# Patient Record
Sex: FEMALE | Race: BLACK OR AFRICAN AMERICAN | Employment: UNEMPLOYED | URBAN - METROPOLITAN AREA
[De-identification: names, ages, dates, MRNs, and addresses within clinical notes are randomized per-mention and may not be internally consistent; named-entity substitution may affect disease eponyms.]

---

## 2024-05-13 ENCOUNTER — OFFICE VISIT (OUTPATIENT)
Age: 34
End: 2024-05-13
Payer: MEDICAID

## 2024-05-13 VITALS
HEIGHT: 66 IN | BODY MASS INDEX: 47.09 KG/M2 | WEIGHT: 293 LBS | DIASTOLIC BLOOD PRESSURE: 85 MMHG | RESPIRATION RATE: 13 BRPM | SYSTOLIC BLOOD PRESSURE: 138 MMHG | HEART RATE: 98 BPM | OXYGEN SATURATION: 98 % | TEMPERATURE: 98.4 F

## 2024-05-13 DIAGNOSIS — L73.2 HIDRADENITIS SUPPURATIVA OF MULTIPLE SITES: Primary | ICD-10-CM

## 2024-05-13 PROCEDURE — 99203 OFFICE O/P NEW LOW 30 MIN: CPT | Performed by: SURGERY

## 2024-05-13 RX ORDER — ALPRAZOLAM 0.5 MG/1
0.5 TABLET ORAL 2 TIMES DAILY PRN
COMMUNITY
Start: 2024-02-16

## 2024-05-13 ASSESSMENT — PATIENT HEALTH QUESTIONNAIRE - PHQ9
SUM OF ALL RESPONSES TO PHQ QUESTIONS 1-9: 1
2. FEELING DOWN, DEPRESSED OR HOPELESS: SEVERAL DAYS
SUM OF ALL RESPONSES TO PHQ QUESTIONS 1-9: 1
1. LITTLE INTEREST OR PLEASURE IN DOING THINGS: NOT AT ALL
SUM OF ALL RESPONSES TO PHQ9 QUESTIONS 1 & 2: 1

## 2024-05-13 NOTE — PROGRESS NOTES
Identified patient with two patient identifiers (name and ). Reviewed chart in preparation for visit and have obtained necessary documentation.    Carin Chacon is a 34 y.o. female  Chief Complaint   Patient presents with    New Patient     Neck boil. ED visit 05/10/2024     /85 (Site: Left Upper Arm, Position: Sitting, Cuff Size: Large Adult)   Pulse 98   Temp 98.4 °F (36.9 °C) (Oral)   Resp 13   Ht 1.676 m (5' 6\")   Wt (!) 156.7 kg (345 lb 6.4 oz)   SpO2 98%   BMI 55.75 kg/m²     1. Have you been to the ER, urgent care clinic since your last visit?  Hospitalized since your last visit?Yes [facility] ED 05/10/2024    2. Have you seen or consulted any other health care providers outside of the [facility] since your last visit?  Include any pap smears or colon screening. no

## 2024-05-13 NOTE — H&P (VIEW-ONLY)
Surgery History and Physical    Subjective:      Carin Chacon  is a 34 y.o.   female who presents with multiple sites of hidradenitis.  Her main complaint is in her posterior neck and around her right nipple areola complex. These have been flaring up from time to time for many years. Currently on Bactrim with some resolution of the drainage from her neck..    Past Medical History:   Diagnosis Date    ADD (attention deficit disorder) 2014    Aggressive outburst     Anxiety disorder     Arthritis     right ankle     Asthma     Bipolar depression ([facility])     Bipolar disorder ([facility]) 2013    Chronic pain     back     Contact dermatitis and other eczema, due to unspecified cause     boil    Depression     Diabetes ([facility])     borderline/ no meds    Ectopic pregnancy, tubal     Essential hypertension     Headache(784.0)     migraines    Hidradenitis suppurativa of multiple sites 2015    Ill-defined condition     anxiety disorder    Infertility, female     Cheyanne ([facility])     Mood disorder ([facility]) 2014    Morbid obesity ([facility])     Psychotic disorder ([facility])     Recurrent boils     Sickle cell disease ([facility])     trait    Sleep apnea 2017    has machine/not using    Sleep disorder     Substance abuse ([facility])     Suicidal thoughts     Syphilis        Past Surgical History:   Procedure Laterality Date     DELIVERY ONLY       SECTION  14     SECTION  2017    CYST REMOVAL  2017    Pilonidal cystectomy- Brandy Frost MD    OTHER SURGICAL HISTORY      ectopic pregnancy    OTHER SURGICAL HISTORY  2017    pilonidal cyst       Social History     Tobacco Use    Smoking status: Every Day     Current packs/day: 1.00     Types: Cigarettes    Smokeless tobacco: Former   Substance Use Topics    Alcohol use: No       Family History   Problem Relation Age of Onset    Osteoarthritis Paternal Grandmother     Diabetes Maternal Grandmother     Migraines Maternal Grandmother      nuchal ridge. Sebveral pustules in the right areola and some drainage from the right nipple.    Labs: No results found for this or any previous visit (from the past 24 hour(s)).    Data Review:   none    Assessment and Plan:      Diagnosis Orders   1. Hidradenitis suppurativa of multiple sites            She has reached the pont she wishes to deal with these two areas.  I think a right ductal excision with sacrifice of some of the arealar skin will work.  In the neck I would debride the areas of drainage and whatever is undermining the skin in the area.  Probably can be done as an outpatient, but may need to spend the night if need be.      Signed By: Lorenzo Barraza MD     05/13/24

## 2024-05-13 NOTE — PROGRESS NOTES
Surgery History and Physical    Subjective:      Carin Chacon  is a 34 y.o.   female who presents with multiple sites of hidradenitis.  Her main complaint is in her posterior neck and around her right nipple areola complex. These have been flaring up from time to time for many years. Currently on Bactrim with some resolution of the drainage from her neck..    Past Medical History:   Diagnosis Date    ADD (attention deficit disorder) 2014    Aggressive outburst     Anxiety disorder     Arthritis     right ankle     Asthma     Bipolar depression ([facility])     Bipolar disorder ([facility]) 2013    Chronic pain     back     Contact dermatitis and other eczema, due to unspecified cause     boil    Depression     Diabetes ([facility])     borderline/ no meds    Ectopic pregnancy, tubal     Essential hypertension     Headache(784.0)     migraines    Hidradenitis suppurativa of multiple sites 2015    Ill-defined condition     anxiety disorder    Infertility, female     Cheyanne ([facility])     Mood disorder ([facility]) 2014    Morbid obesity ([facility])     Psychotic disorder ([facility])     Recurrent boils     Sickle cell disease ([facility])     trait    Sleep apnea 2017    has machine/not using    Sleep disorder     Substance abuse ([facility])     Suicidal thoughts     Syphilis        Past Surgical History:   Procedure Laterality Date     DELIVERY ONLY       SECTION  14     SECTION  2017    CYST REMOVAL  2017    Pilonidal cystectomy- Brandy Frost MD    OTHER SURGICAL HISTORY      ectopic pregnancy    OTHER SURGICAL HISTORY  2017    pilonidal cyst       Social History     Tobacco Use    Smoking status: Every Day     Current packs/day: 1.00     Types: Cigarettes    Smokeless tobacco: Former   Substance Use Topics    Alcohol use: No       Family History   Problem Relation Age of Onset    Osteoarthritis Paternal Grandmother     Diabetes Maternal Grandmother     Migraines Maternal Grandmother

## 2024-05-14 ENCOUNTER — TELEPHONE (OUTPATIENT)
Age: 34
End: 2024-05-14

## 2024-05-14 ENCOUNTER — PREP FOR PROCEDURE (OUTPATIENT)
Age: 34
End: 2024-05-14

## 2024-05-14 RX ORDER — ACETAMINOPHEN 325 MG/1
1000 TABLET ORAL ONCE
Status: CANCELLED | OUTPATIENT
Start: 2024-05-14 | End: 2024-05-14

## 2024-05-14 RX ORDER — SODIUM CHLORIDE 0.9 % (FLUSH) 0.9 %
5-40 SYRINGE (ML) INJECTION PRN
Status: CANCELLED | OUTPATIENT
Start: 2024-05-14

## 2024-05-14 RX ORDER — SODIUM CHLORIDE 9 MG/ML
INJECTION, SOLUTION INTRAVENOUS PRN
Status: CANCELLED | OUTPATIENT
Start: 2024-05-14

## 2024-05-14 RX ORDER — SODIUM CHLORIDE 0.9 % (FLUSH) 0.9 %
5-40 SYRINGE (ML) INJECTION EVERY 12 HOURS SCHEDULED
Status: CANCELLED | OUTPATIENT
Start: 2024-05-14

## 2024-05-14 NOTE — TELEPHONE ENCOUNTER
Contacted patient to schedule surgery with Dr. Barraza. Patient stated that she wanted the first available. Offered patient 5/23 and patient accepted. I notified patient that I would send a surgical letter to her home address since [facility] is not active. Patient thanked me for the call.

## 2024-05-14 NOTE — TELEPHONE ENCOUNTER
Patient scheduled for surgery with Dr. Barraza on 5/23. Patient wanted to speak with a nurse regarding her piercing that she has and if they need to be removed.

## 2024-05-14 NOTE — TELEPHONE ENCOUNTER
Returned call to patient.  Two patient identifiers used. I made patient aware piercing would have to be removed. Patient asked if she could place the rubber pieces in, per Dr. Barraza the rubber pieces are fine. Patient verbalized understanding and thanked for call.

## 2024-05-17 ENCOUNTER — HOSPITAL ENCOUNTER (OUTPATIENT)
Facility: HOSPITAL | Age: 34
End: 2024-05-17
Payer: MEDICAID

## 2024-05-17 VITALS
DIASTOLIC BLOOD PRESSURE: 83 MMHG | WEIGHT: 293 LBS | SYSTOLIC BLOOD PRESSURE: 137 MMHG | BODY MASS INDEX: 45.99 KG/M2 | TEMPERATURE: 98.4 F | RESPIRATION RATE: 18 BRPM | HEIGHT: 67 IN

## 2024-05-17 LAB
ANION GAP SERPL CALC-SCNC: 5 MMOL/L (ref 5–15)
BASOPHILS # BLD: 0 K/UL (ref 0–0.1)
BASOPHILS NFR BLD: 0 % (ref 0–1)
BUN SERPL-MCNC: 12 MG/DL (ref 6–20)
BUN/CREAT SERPL: 13 (ref 12–20)
CALCIUM SERPL-MCNC: 8.6 MG/DL (ref 8.5–10.1)
CHLORIDE SERPL-SCNC: 108 MMOL/L (ref 97–108)
CO2 SERPL-SCNC: 28 MMOL/L (ref 21–32)
CREAT SERPL-MCNC: 0.89 MG/DL (ref 0.55–1.02)
DIFFERENTIAL METHOD BLD: ABNORMAL
EOSINOPHIL # BLD: 0.2 K/UL (ref 0–0.4)
EOSINOPHIL NFR BLD: 2 % (ref 0–7)
ERYTHROCYTE [DISTWIDTH] IN BLOOD BY AUTOMATED COUNT: 17.5 % (ref 11.5–14.5)
GLUCOSE SERPL-MCNC: 115 MG/DL (ref 65–100)
HCT VFR BLD AUTO: 34.6 % (ref 35–47)
HGB BLD-MCNC: 10.8 G/DL (ref 11.5–16)
IMM GRANULOCYTES # BLD AUTO: 0 K/UL (ref 0–0.04)
IMM GRANULOCYTES NFR BLD AUTO: 0 % (ref 0–0.5)
LYMPHOCYTES # BLD: 3.5 K/UL (ref 0.8–3.5)
LYMPHOCYTES NFR BLD: 39 % (ref 12–49)
MCH RBC QN AUTO: 21.9 PG (ref 26–34)
MCHC RBC AUTO-ENTMCNC: 31.2 G/DL (ref 30–36.5)
MCV RBC AUTO: 70 FL (ref 80–99)
MONOCYTES # BLD: 0.6 K/UL (ref 0–1)
MONOCYTES NFR BLD: 6 % (ref 5–13)
NEUTS SEG # BLD: 4.6 K/UL (ref 1.8–8)
NEUTS SEG NFR BLD: 53 % (ref 32–75)
NRBC # BLD: 0 K/UL (ref 0–0.01)
NRBC BLD-RTO: 0 PER 100 WBC
PLATELET # BLD AUTO: 410 K/UL (ref 150–400)
PMV BLD AUTO: 9.1 FL (ref 8.9–12.9)
POTASSIUM SERPL-SCNC: 3.5 MMOL/L (ref 3.5–5.1)
RBC # BLD AUTO: 4.94 M/UL (ref 3.8–5.2)
SODIUM SERPL-SCNC: 141 MMOL/L (ref 136–145)
WBC # BLD AUTO: 8.9 K/UL (ref 3.6–11)

## 2024-05-17 PROCEDURE — 80048 BASIC METABOLIC PNL TOTAL CA: CPT

## 2024-05-17 PROCEDURE — 36415 COLL VENOUS BLD VENIPUNCTURE: CPT

## 2024-05-17 PROCEDURE — 85025 COMPLETE CBC W/AUTO DIFF WBC: CPT

## 2024-05-17 ASSESSMENT — PAIN DESCRIPTION - ORIENTATION: ORIENTATION: RIGHT

## 2024-05-17 ASSESSMENT — PAIN - FUNCTIONAL ASSESSMENT: PAIN_FUNCTIONAL_ASSESSMENT: PREVENTS OR INTERFERES WITH MANY ACTIVE NOT PASSIVE ACTIVITIES

## 2024-05-17 ASSESSMENT — PAIN DESCRIPTION - PAIN TYPE: TYPE: CHRONIC PAIN

## 2024-05-17 ASSESSMENT — PAIN DESCRIPTION - FREQUENCY: FREQUENCY: CONTINUOUS

## 2024-05-17 ASSESSMENT — PAIN DESCRIPTION - LOCATION: LOCATION: BREAST;NECK

## 2024-05-17 ASSESSMENT — PAIN SCALES - GENERAL: PAINLEVEL_OUTOF10: 9

## 2024-05-17 ASSESSMENT — PAIN DESCRIPTION - DESCRIPTORS: DESCRIPTORS: STABBING

## 2024-05-17 NOTE — PERIOP NOTE
[address] [address]   MAIN OR                                  [phone]    MAIN PRE OP             [phone]                                                                                AMBULATORY PRE OP          [phone]  PRE-ADMISSION TESTING    [phone]     Surgery Date:  5/23/24       Is surgery arrival time given by surgeon?  YES      If “NO”, [place] staff will call you between 4 and 7pm the day before your surgery with your arrival time. (If your surgery is on a Monday, we will call you the Friday before.)    Call [phone] after 7pm Monday-Friday if you did not receive this call.    INSTRUCTIONS BEFORE YOUR SURGERY   When You  Arrive Arrive at [facility] Patient Access on 1st floor the day of your surgery.  Have your insurance card, photo ID,living will/advanced directive/POA (if applicable),  and any copayment (if needed)   Food   and   Drink NO solid food after midnight the night before surgery. You can drink clear liquids from midnight until ONE hour prior to your arrival at the hospital on the day of your surgery. Clear liquids include:  Water  Fruit juices without pulp  Carbonated beverages  Black coffee(no cream/milk)  Tea(no cream/milk)  Gatorade    No alcohol (beer, wine, liquor) or marijuana (smoking) 24 hours, edibles (3 days). Stop smoking cigarettes 14 days before surgery (helps w/healing and breathing).   Medications to   TAKE   Morning of Surgery MEDICATIONS TO TAKE THE MORNING OF SURGERY WITH A SIP OF WATER:   NONE  You may take these medications, IF NEEDED, the morning of surgery: ALBUTEROL.  MAY TAKE ALPRAZOLAM 4 HRS PRIOR TO ARRIVAL TIME IF NEEDED.  Ask your surgeon/prescribing doctor for instructions on taking or stopping these medications prior to surgery: N/A   Medications to STOP  before surgery Non-Steroidal anti-inflammatory Drugs (NSAID's): for example, Ibuprofen (Advil, Motrin), Naproxen

## 2024-05-22 ENCOUNTER — ANESTHESIA EVENT (OUTPATIENT)
Facility: HOSPITAL | Age: 34
End: 2024-05-22
Payer: MEDICAID

## 2024-05-22 NOTE — ANESTHESIA PRE PROCEDURE
Department of Anesthesiology  Preprocedure Note       Name:  Carin Chacon   Age:  34 y.o.  :  1990                                          MRN:  [id number]         Date:  2024      Surgeon: Surgeon(s):  Lorenzo Barraza MD    Procedure: Procedure(s):  EXCISION OF HIDRADENITIS OF POSTERIOR NECK AND RIGHT BREAST    Medications prior to admission:   Prior to Admission medications    Medication Sig Start Date End Date Taking? Authorizing Provider   ALPRAZolam (XANAX) 0.5 MG tablet Take 1 tablet by mouth 2 times daily as needed. 24   Provider, MD Kiarra   albuterol sulfate HFA (PROVENTIL;VENTOLIN;PROAIR) 108 (90 Base) MCG/ACT inhaler Inhale 2 puffs into the lungs every 4 hours as needed 22   Automatic Reconciliation, Ar   albuterol (PROVENTIL) (2.5 MG/3ML) 0.083% nebulizer solution Inhale 3 mLs into the lungs every 6 hours as needed 21   Automatic Reconciliation, Ar       Current medications:    No current facility-administered medications for this encounter.     Current Outpatient Medications   Medication Sig Dispense Refill   • ALPRAZolam (XANAX) 0.5 MG tablet Take 1 tablet by mouth 2 times daily as needed.     • albuterol sulfate HFA (PROVENTIL;VENTOLIN;PROAIR) 108 (90 Base) MCG/ACT inhaler Inhale 2 puffs into the lungs every 4 hours as needed     • albuterol (PROVENTIL) (2.5 MG/3ML) 0.083% nebulizer solution Inhale 3 mLs into the lungs every 6 hours as needed         Allergies:  No Known Allergies    Problem List:    Patient Active Problem List   Diagnosis Code   • Bipolar disorder ([facility]) F31.9   • Hyperglycemia R73.9   • Cellulitis L03.90   • Obesity, morbid, BMI 50 or higher ([facility]) E66.01   • Low back pain M54.50   • Pregnant Z34.90   • LANDY (obstructive sleep apnea) G47.33   • ADD (attention deficit disorder) F98.8   • Anemia D64.9   • Moderate persistent asthma with acute exacerbation J45.41   • Ventral hernia without obstruction or gangrene K43.9   • Hidradenitis suppurativa of

## 2024-05-23 ENCOUNTER — HOSPITAL ENCOUNTER (OUTPATIENT)
Facility: HOSPITAL | Age: 34
Setting detail: OUTPATIENT SURGERY
Discharge: HOME | End: 2024-05-23
Attending: SURGERY | Admitting: SURGERY
Payer: MEDICAID

## 2024-05-23 ENCOUNTER — ANESTHESIA (OUTPATIENT)
Facility: HOSPITAL | Age: 34
End: 2024-05-23
Payer: MEDICAID

## 2024-05-23 VITALS
BODY MASS INDEX: 47.09 KG/M2 | HEIGHT: 66 IN | RESPIRATION RATE: 18 BRPM | SYSTOLIC BLOOD PRESSURE: 143 MMHG | HEART RATE: 70 BPM | TEMPERATURE: 97.7 F | OXYGEN SATURATION: 98 % | DIASTOLIC BLOOD PRESSURE: 81 MMHG | WEIGHT: 293 LBS

## 2024-05-23 DIAGNOSIS — G89.18 POST-OP PAIN: Primary | ICD-10-CM

## 2024-05-23 LAB
GLUCOSE BLD STRIP.AUTO-MCNC: 139 MG/DL (ref 65–117)
HCG UR QL: NEGATIVE
SERVICE CMNT-IMP: ABNORMAL

## 2024-05-23 PROCEDURE — 87070 CULTURE OTHR SPECIMN AEROBIC: CPT

## 2024-05-23 PROCEDURE — 87077 CULTURE AEROBIC IDENTIFY: CPT

## 2024-05-23 PROCEDURE — 3600000002 HC SURGERY LEVEL 2 BASE: Performed by: SURGERY

## 2024-05-23 PROCEDURE — 7100000011 HC PHASE II RECOVERY - ADDTL 15 MIN: Performed by: SURGERY

## 2024-05-23 PROCEDURE — 6360000002 HC RX W HCPCS: Performed by: STUDENT IN AN ORGANIZED HEALTH CARE EDUCATION/TRAINING PROGRAM

## 2024-05-23 PROCEDURE — 2500000003 HC RX 250 WO HCPCS: Performed by: SURGERY

## 2024-05-23 PROCEDURE — 7100000010 HC PHASE II RECOVERY - FIRST 15 MIN: Performed by: SURGERY

## 2024-05-23 PROCEDURE — 3700000000 HC ANESTHESIA ATTENDED CARE: Performed by: SURGERY

## 2024-05-23 PROCEDURE — 7100000000 HC PACU RECOVERY - FIRST 15 MIN: Performed by: SURGERY

## 2024-05-23 PROCEDURE — 2500000003 HC RX 250 WO HCPCS: Performed by: NURSE ANESTHETIST, CERTIFIED REGISTERED

## 2024-05-23 PROCEDURE — 2580000003 HC RX 258: Performed by: STUDENT IN AN ORGANIZED HEALTH CARE EDUCATION/TRAINING PROGRAM

## 2024-05-23 PROCEDURE — 6360000002 HC RX W HCPCS: Performed by: NURSE ANESTHETIST, CERTIFIED REGISTERED

## 2024-05-23 PROCEDURE — 82962 GLUCOSE BLOOD TEST: CPT

## 2024-05-23 PROCEDURE — 87075 CULTR BACTERIA EXCEPT BLOOD: CPT

## 2024-05-23 PROCEDURE — 3600000012 HC SURGERY LEVEL 2 ADDTL 15MIN: Performed by: SURGERY

## 2024-05-23 PROCEDURE — 6370000000 HC RX 637 (ALT 250 FOR IP): Performed by: SURGERY

## 2024-05-23 PROCEDURE — 11450 EXC SKN HDRDNT AX SMPL/NTRM: CPT | Performed by: SURGERY

## 2024-05-23 PROCEDURE — 6360000002 HC RX W HCPCS: Performed by: SURGERY

## 2024-05-23 PROCEDURE — 81025 URINE PREGNANCY TEST: CPT

## 2024-05-23 PROCEDURE — 88304 TISSUE EXAM BY PATHOLOGIST: CPT

## 2024-05-23 PROCEDURE — 3700000001 HC ADD 15 MINUTES (ANESTHESIA): Performed by: SURGERY

## 2024-05-23 PROCEDURE — 87076 CULTURE ANAEROBE IDENT EACH: CPT

## 2024-05-23 PROCEDURE — 2709999900 HC NON-CHARGEABLE SUPPLY: Performed by: SURGERY

## 2024-05-23 PROCEDURE — 7100000001 HC PACU RECOVERY - ADDTL 15 MIN: Performed by: SURGERY

## 2024-05-23 PROCEDURE — 2580000003 HC RX 258: Performed by: SURGERY

## 2024-05-23 PROCEDURE — 87205 SMEAR GRAM STAIN: CPT

## 2024-05-23 RX ORDER — FENTANYL CITRATE 50 UG/ML
100 INJECTION, SOLUTION INTRAMUSCULAR; INTRAVENOUS
Status: DISCONTINUED | OUTPATIENT
Start: 2024-05-23 | End: 2024-05-23 | Stop reason: HOSPADM

## 2024-05-23 RX ORDER — MIDAZOLAM HYDROCHLORIDE 1 MG/ML
INJECTION INTRAMUSCULAR; INTRAVENOUS PRN
Status: DISCONTINUED | OUTPATIENT
Start: 2024-05-23 | End: 2024-05-23 | Stop reason: SDUPTHER

## 2024-05-23 RX ORDER — SODIUM CHLORIDE 9 MG/ML
INJECTION, SOLUTION INTRAVENOUS PRN
Status: DISCONTINUED | OUTPATIENT
Start: 2024-05-23 | End: 2024-05-23 | Stop reason: HOSPADM

## 2024-05-23 RX ORDER — ONDANSETRON 2 MG/ML
INJECTION INTRAMUSCULAR; INTRAVENOUS PRN
Status: DISCONTINUED | OUTPATIENT
Start: 2024-05-23 | End: 2024-05-23 | Stop reason: SDUPTHER

## 2024-05-23 RX ORDER — BUPIVACAINE HYDROCHLORIDE AND EPINEPHRINE 5; 5 MG/ML; UG/ML
INJECTION, SOLUTION EPIDURAL; INTRACAUDAL; PERINEURAL PRN
Status: DISCONTINUED | OUTPATIENT
Start: 2024-05-23 | End: 2024-05-23 | Stop reason: HOSPADM

## 2024-05-23 RX ORDER — SODIUM CHLORIDE, SODIUM LACTATE, POTASSIUM CHLORIDE, CALCIUM CHLORIDE 600; 310; 30; 20 MG/100ML; MG/100ML; MG/100ML; MG/100ML
INJECTION, SOLUTION INTRAVENOUS CONTINUOUS
Status: DISCONTINUED | OUTPATIENT
Start: 2024-05-23 | End: 2024-05-23 | Stop reason: HOSPADM

## 2024-05-23 RX ORDER — ONDANSETRON 2 MG/ML
4 INJECTION INTRAMUSCULAR; INTRAVENOUS
Status: COMPLETED | OUTPATIENT
Start: 2024-05-23 | End: 2024-05-23

## 2024-05-23 RX ORDER — SODIUM CHLORIDE 0.9 % (FLUSH) 0.9 %
5-40 SYRINGE (ML) INJECTION PRN
Status: DISCONTINUED | OUTPATIENT
Start: 2024-05-23 | End: 2024-05-23 | Stop reason: HOSPADM

## 2024-05-23 RX ORDER — CEPHALEXIN 500 MG/1
500 CAPSULE ORAL 4 TIMES DAILY
Qty: 40 CAPSULE | Refills: 0 | Status: SHIPPED | OUTPATIENT
Start: 2024-05-23 | End: 2024-06-02

## 2024-05-23 RX ORDER — LIDOCAINE HYDROCHLORIDE 10 MG/ML
1 INJECTION, SOLUTION EPIDURAL; INFILTRATION; INTRACAUDAL; PERINEURAL
Status: DISCONTINUED | OUTPATIENT
Start: 2024-05-23 | End: 2024-05-23 | Stop reason: HOSPADM

## 2024-05-23 RX ORDER — ACETAMINOPHEN 500 MG
1000 TABLET ORAL ONCE
Status: COMPLETED | OUTPATIENT
Start: 2024-05-23 | End: 2024-05-23

## 2024-05-23 RX ORDER — LIDOCAINE HYDROCHLORIDE 20 MG/ML
INJECTION, SOLUTION EPIDURAL; INFILTRATION; INTRACAUDAL; PERINEURAL PRN
Status: DISCONTINUED | OUTPATIENT
Start: 2024-05-23 | End: 2024-05-23 | Stop reason: SDUPTHER

## 2024-05-23 RX ORDER — FENTANYL CITRATE 50 UG/ML
INJECTION, SOLUTION INTRAMUSCULAR; INTRAVENOUS PRN
Status: DISCONTINUED | OUTPATIENT
Start: 2024-05-23 | End: 2024-05-23 | Stop reason: SDUPTHER

## 2024-05-23 RX ORDER — OXYCODONE HYDROCHLORIDE AND ACETAMINOPHEN 5; 325 MG/1; MG/1
1 TABLET ORAL EVERY 6 HOURS PRN
Qty: 20 TABLET | Refills: 0 | Status: SHIPPED | OUTPATIENT
Start: 2024-05-23 | End: 2024-05-28

## 2024-05-23 RX ORDER — DEXAMETHASONE SODIUM PHOSPHATE 4 MG/ML
INJECTION, SOLUTION INTRA-ARTICULAR; INTRALESIONAL; INTRAMUSCULAR; INTRAVENOUS; SOFT TISSUE PRN
Status: DISCONTINUED | OUTPATIENT
Start: 2024-05-23 | End: 2024-05-23 | Stop reason: SDUPTHER

## 2024-05-23 RX ORDER — MIDAZOLAM HYDROCHLORIDE 2 MG/2ML
2 INJECTION, SOLUTION INTRAMUSCULAR; INTRAVENOUS
Status: DISCONTINUED | OUTPATIENT
Start: 2024-05-23 | End: 2024-05-23 | Stop reason: HOSPADM

## 2024-05-23 RX ORDER — HYDRALAZINE HYDROCHLORIDE 20 MG/ML
10 INJECTION INTRAMUSCULAR; INTRAVENOUS
Status: DISCONTINUED | OUTPATIENT
Start: 2024-05-23 | End: 2024-05-23 | Stop reason: HOSPADM

## 2024-05-23 RX ORDER — KETOROLAC TROMETHAMINE 30 MG/ML
INJECTION, SOLUTION INTRAMUSCULAR; INTRAVENOUS PRN
Status: DISCONTINUED | OUTPATIENT
Start: 2024-05-23 | End: 2024-05-23 | Stop reason: SDUPTHER

## 2024-05-23 RX ORDER — ROCURONIUM BROMIDE 10 MG/ML
INJECTION, SOLUTION INTRAVENOUS PRN
Status: DISCONTINUED | OUTPATIENT
Start: 2024-05-23 | End: 2024-05-23 | Stop reason: SDUPTHER

## 2024-05-23 RX ORDER — FENTANYL CITRATE 50 UG/ML
25 INJECTION, SOLUTION INTRAMUSCULAR; INTRAVENOUS EVERY 5 MIN PRN
Status: DISCONTINUED | OUTPATIENT
Start: 2024-05-23 | End: 2024-05-23 | Stop reason: HOSPADM

## 2024-05-23 RX ORDER — SODIUM CHLORIDE 0.9 % (FLUSH) 0.9 %
5-40 SYRINGE (ML) INJECTION EVERY 12 HOURS SCHEDULED
Status: DISCONTINUED | OUTPATIENT
Start: 2024-05-23 | End: 2024-05-23 | Stop reason: HOSPADM

## 2024-05-23 RX ORDER — PROPOFOL 10 MG/ML
INJECTION, EMULSION INTRAVENOUS PRN
Status: DISCONTINUED | OUTPATIENT
Start: 2024-05-23 | End: 2024-05-23 | Stop reason: SDUPTHER

## 2024-05-23 RX ORDER — SUCCINYLCHOLINE/SOD CL,ISO/PF 200MG/10ML
SYRINGE (ML) INTRAVENOUS PRN
Status: DISCONTINUED | OUTPATIENT
Start: 2024-05-23 | End: 2024-05-23 | Stop reason: SDUPTHER

## 2024-05-23 RX ORDER — HYDROMORPHONE HYDROCHLORIDE 1 MG/ML
0.5 INJECTION, SOLUTION INTRAMUSCULAR; INTRAVENOUS; SUBCUTANEOUS EVERY 5 MIN PRN
Status: DISCONTINUED | OUTPATIENT
Start: 2024-05-23 | End: 2024-05-23 | Stop reason: HOSPADM

## 2024-05-23 RX ORDER — ACETAMINOPHEN 500 MG
1000 TABLET ORAL ONCE
Status: DISCONTINUED | OUTPATIENT
Start: 2024-05-23 | End: 2024-05-23 | Stop reason: HOSPADM

## 2024-05-23 RX ORDER — PROCHLORPERAZINE EDISYLATE 5 MG/ML
5 INJECTION INTRAMUSCULAR; INTRAVENOUS
Status: COMPLETED | OUTPATIENT
Start: 2024-05-23 | End: 2024-05-23

## 2024-05-23 RX ORDER — NALOXONE HYDROCHLORIDE 0.4 MG/ML
INJECTION, SOLUTION INTRAMUSCULAR; INTRAVENOUS; SUBCUTANEOUS PRN
Status: DISCONTINUED | OUTPATIENT
Start: 2024-05-23 | End: 2024-05-23 | Stop reason: HOSPADM

## 2024-05-23 RX ORDER — OXYCODONE HYDROCHLORIDE 5 MG/1
5 TABLET ORAL
Status: DISCONTINUED | OUTPATIENT
Start: 2024-05-23 | End: 2024-05-23 | Stop reason: HOSPADM

## 2024-05-23 RX ADMIN — FENTANYL CITRATE 50 MCG: 0.05 INJECTION, SOLUTION INTRAMUSCULAR; INTRAVENOUS at 09:12

## 2024-05-23 RX ADMIN — PROPOFOL 200 MG: 10 INJECTION, EMULSION INTRAVENOUS at 08:03

## 2024-05-23 RX ADMIN — FENTANYL CITRATE 25 MCG: 50 INJECTION INTRAMUSCULAR; INTRAVENOUS at 10:15

## 2024-05-23 RX ADMIN — PROCHLORPERAZINE EDISYLATE 5 MG: 5 INJECTION INTRAMUSCULAR; INTRAVENOUS at 10:05

## 2024-05-23 RX ADMIN — FENTANYL CITRATE 100 MCG: 0.05 INJECTION, SOLUTION INTRAMUSCULAR; INTRAVENOUS at 08:03

## 2024-05-23 RX ADMIN — MIDAZOLAM HYDROCHLORIDE 1 MG: 1 INJECTION, SOLUTION INTRAMUSCULAR; INTRAVENOUS at 08:03

## 2024-05-23 RX ADMIN — ONDANSETRON 4 MG: 2 INJECTION INTRAMUSCULAR; INTRAVENOUS at 09:57

## 2024-05-23 RX ADMIN — DEXAMETHASONE SODIUM PHOSPHATE 8 MG: 4 INJECTION INTRA-ARTICULAR; INTRALESIONAL; INTRAMUSCULAR; INTRAVENOUS; SOFT TISSUE at 08:15

## 2024-05-23 RX ADMIN — ROCURONIUM BROMIDE 5 MG: 50 INJECTION INTRAVENOUS at 08:03

## 2024-05-23 RX ADMIN — KETOROLAC TROMETHAMINE 30 MG: 30 INJECTION, SOLUTION INTRAMUSCULAR at 09:25

## 2024-05-23 RX ADMIN — FENTANYL CITRATE 50 MCG: 0.05 INJECTION, SOLUTION INTRAMUSCULAR; INTRAVENOUS at 08:27

## 2024-05-23 RX ADMIN — ONDANSETRON 4 MG: 2 INJECTION INTRAMUSCULAR; INTRAVENOUS at 09:25

## 2024-05-23 RX ADMIN — MIDAZOLAM HYDROCHLORIDE 4 MG: 1 INJECTION, SOLUTION INTRAMUSCULAR; INTRAVENOUS at 07:57

## 2024-05-23 RX ADMIN — FENTANYL CITRATE 50 MCG: 0.05 INJECTION, SOLUTION INTRAMUSCULAR; INTRAVENOUS at 08:32

## 2024-05-23 RX ADMIN — LIDOCAINE HYDROCHLORIDE 100 MG: 20 INJECTION, SOLUTION EPIDURAL; INFILTRATION; INTRACAUDAL; PERINEURAL at 08:03

## 2024-05-23 RX ADMIN — SODIUM CHLORIDE 3000 MG: 900 INJECTION INTRAVENOUS at 08:15

## 2024-05-23 RX ADMIN — Medication 200 MG: at 08:04

## 2024-05-23 RX ADMIN — ACETAMINOPHEN 1000 MG: 500 TABLET ORAL at 06:59

## 2024-05-23 RX ADMIN — SODIUM CHLORIDE, POTASSIUM CHLORIDE, SODIUM LACTATE AND CALCIUM CHLORIDE: 600; 310; 30; 20 INJECTION, SOLUTION INTRAVENOUS at 07:16

## 2024-05-23 ASSESSMENT — PAIN SCALES - GENERAL
PAINLEVEL_OUTOF10: 10
PAINLEVEL_OUTOF10: 8
PAINLEVEL_OUTOF10: 6

## 2024-05-23 ASSESSMENT — PAIN - FUNCTIONAL ASSESSMENT
PAIN_FUNCTIONAL_ASSESSMENT: ACTIVITIES ARE NOT PREVENTED
PAIN_FUNCTIONAL_ASSESSMENT: ACTIVITIES ARE NOT PREVENTED
PAIN_FUNCTIONAL_ASSESSMENT: 0-10

## 2024-05-23 ASSESSMENT — PAIN DESCRIPTION - DESCRIPTORS
DESCRIPTORS: STABBING
DESCRIPTORS: STABBING

## 2024-05-23 ASSESSMENT — PAIN DESCRIPTION - LOCATION
LOCATION: BREAST
LOCATION: NECK;BREAST

## 2024-05-23 ASSESSMENT — PAIN DESCRIPTION - ORIENTATION: ORIENTATION: RIGHT

## 2024-05-23 NOTE — DISCHARGE INSTRUCTIONS
Patient Discharge Instructions    Carin Chacon / [id number] : 1990    Admitted 2024 Discharged: 2024     Take Home Medications            It is important that you take the medication exactly as they are prescribed.   Keep your medication in the bottles provided by the pharmacist and keep a list of the medication names, dosages, and times to be taken in your wallet.   Do not take other medications without consulting your doctor.       What to do at Home    Recommended diet: regular diet,     Recommended activity: activity as tolerated, may shower whenever you wish; change dressings as needed                  Information obtained by :  I understand that if any problems occur once I am at home I am to contact my physician.    I understand and acknowledge receipt of the instructions indicated above.                                                                                                                                           Physician's or R.N.'s Signature                                                                  Date/Time                                                                                                                                              Patient or Representative Signature                                                          Date/Time    ______________________________________________________________________    Anesthesia Discharge Instructions    After general anesthesia or intervenous sedation, for 24 hours or while taking prescription Narcotics:  Limit your activities  Do not drive or operate hazardous machinery  If you have not urinated within 8 hours after discharge, please contact your surgeon on call.  Do not make important personal or business decisions  Do not drink alcoholic beverages    Report the following to your surgeon:  Excessive pain, swelling, redness or odor of or around the surgical area  Temperature over 100.5 degrees  Nausea and

## 2024-05-23 NOTE — ANESTHESIA POSTPROCEDURE EVALUATION
Department of Anesthesiology  Postprocedure Note    Patient: Carin Chacon  MRN: [id number]  YOB: 1990  Date of evaluation: 5/23/2024    Procedure Summary       Date: 05/23/24 Room / Location: [facility] MAIN OR [address] [place] [address] MAIN OR    Anesthesia Start: 0757 Anesthesia Stop: 0938    Procedures:       EXCISION OF HIDRADENITIS OF POSTERIOR NECK (Neck)      EXCISION OF HIDRADENITIS OF RIGHT BREAST (Right: Breast) Diagnosis:       Hidradenitis suppurativa of multiple sites      (Hidradenitis suppurativa of multiple sites [L73.2])    Providers: Lorenzo Barraza MD Responsible Provider: Octavio Marquez DO    Anesthesia Type: general ASA Status: 3            Anesthesia Type: No value filed.    Loli Phase I: Loli Score: 10    Loli Phase II:      Anesthesia Post Evaluation    Patient location during evaluation: PACU  Patient participation: complete - patient participated  Level of consciousness: awake  Airway patency: patent  Cardiovascular status: blood pressure returned to baseline  Respiratory status: acceptable  Pain management: adequate    No notable events documented.

## 2024-05-23 NOTE — INTERVAL H&P NOTE
Update History & Physical    The patient's History and Physical of May 13, 2024 was reviewed with the patient and I examined the patient. There was no change. The surgical site was confirmed by the patient and me.     Plan: The risks, benefits, expected outcome, and alternative to the recommended procedure have been discussed with the patient. Patient understands and wants to proceed with the procedure.     Electronically signed by Lorenzo Barraza MD on 5/23/2024 at 7:00 AM

## 2024-05-23 NOTE — PERIOP NOTE
Patient educated on discharge instructions with friend. Discussed medications, follow-up appointment, dressings, and drains.    IV removed. Patient taken in wheelchair for discharge.

## 2024-05-24 NOTE — OP NOTE
[address]  [address]                            OPERATIVE REPORT      PATIENT NAME: ASHISH GUZMÁN                : 1990  MED REC NO: [id number]                       ROOM: OR  ACCOUNT NO: [id number]                       ADMIT DATE: 2024  PROVIDER: Lorenzo Barraza MD    DATE OF SERVICE:  2024    PREOPERATIVE DIAGNOSES:  Hidradenitis suppurativa of multiple sites.    POSTOPERATIVE DIAGNOSES:  Hidradenitis suppurativa of multiple sites.    PROCEDURES PERFORMED:       1. Excision and curettage of hidradenitis of the posterior neck.     2. Excision of hidradenitis of the right breast involving the areola.    SURGEON:  Lorenzo Barraza MD    ASSISTANT:  Whit Magana SA.    ANESTHESIA:  General supplemented with 0.5% Marcaine with epinephrine.    ESTIMATED BLOOD LOSS:  Less than 50 mL.    SPECIMENS REMOVED:  Cultures of both sites and the 4 x 4 cm excision of the areola of the right breast.    INTRAOPERATIVE FINDINGS:  There was an infection present at the time of surgery. That was superficial and involved pus in the abscess cavity.  The other findings are that of  hidradenitis of the neck and an abscess cavity in the right breast in the subareolar position.     COMPLICATIONS:  None.    IMPLANTS:  None.    INDICATIONS:  hidradenitis    DESCRIPTION OF PROCEDURE:  The patient was placed under satisfactory general anesthesia and then 1st placed prone on the operating room table with appropriate positioning and padding.  The neck was inspected and there were 3 barreling sites, 1 on the left, 1 on the midline, and 1 on the right.  I coagulated all 3 of these sites with the electrocautery.  I opened up the 1 on the right and passed a curette and curetted the infection vigorously.  I then irrigated with saline.  Then, a quarter-inch Penrose drain was placed underneath all of this to afford better drainage while healing process

## 2024-05-25 LAB
BACTERIA SPEC CULT: ABNORMAL
BACTERIA SPEC CULT: NORMAL
BACTERIA SPEC CULT: NORMAL
GRAM STN SPEC: ABNORMAL
GRAM STN SPEC: ABNORMAL
GRAM STN SPEC: NORMAL
GRAM STN SPEC: NORMAL
SERVICE CMNT-IMP: ABNORMAL
SERVICE CMNT-IMP: ABNORMAL
SERVICE CMNT-IMP: NORMAL
SERVICE CMNT-IMP: NORMAL

## 2024-05-29 ENCOUNTER — OFFICE VISIT (OUTPATIENT)
Age: 34
End: 2024-05-29

## 2024-05-29 ENCOUNTER — TELEPHONE (OUTPATIENT)
Age: 34
End: 2024-05-29

## 2024-05-29 VITALS
SYSTOLIC BLOOD PRESSURE: 122 MMHG | DIASTOLIC BLOOD PRESSURE: 81 MMHG | TEMPERATURE: 99.7 F | RESPIRATION RATE: 18 BRPM | OXYGEN SATURATION: 97 % | BODY MASS INDEX: 47.09 KG/M2 | HEIGHT: 66 IN | WEIGHT: 293 LBS | HEART RATE: 95 BPM

## 2024-05-29 DIAGNOSIS — Z48.89 POSTOPERATIVE VISIT: ICD-10-CM

## 2024-05-29 DIAGNOSIS — G89.18 POST-OP PAIN: Primary | ICD-10-CM

## 2024-05-29 PROCEDURE — 99024 POSTOP FOLLOW-UP VISIT: CPT | Performed by: SURGERY

## 2024-05-29 RX ORDER — TRAMADOL HYDROCHLORIDE 50 MG/1
50 TABLET ORAL EVERY 6 HOURS PRN
Qty: 30 TABLET | Refills: 0 | Status: SHIPPED | OUTPATIENT
Start: 2024-05-29 | End: 2024-06-05

## 2024-05-29 ASSESSMENT — PATIENT HEALTH QUESTIONNAIRE - PHQ9
SUM OF ALL RESPONSES TO PHQ9 QUESTIONS 1 & 2: 1
1. LITTLE INTEREST OR PLEASURE IN DOING THINGS: NOT AT ALL
SUM OF ALL RESPONSES TO PHQ QUESTIONS 1-9: 1
SUM OF ALL RESPONSES TO PHQ QUESTIONS 1-9: 1
2. FEELING DOWN, DEPRESSED OR HOPELESS: SEVERAL DAYS
SUM OF ALL RESPONSES TO PHQ QUESTIONS 1-9: 1
SUM OF ALL RESPONSES TO PHQ QUESTIONS 1-9: 1

## 2024-05-29 NOTE — PROGRESS NOTES
Returns with concern about her breast incision. One stitch has dissolved and the penrose drain in the wound is visible.  She adamantly declined having me remove the drain today.  She will continue with local wound care.  I bet the drain will fall out on its own once the suture holding it dissolves.  Will see her on her regularly scheduled visit.

## 2024-05-29 NOTE — TELEPHONE ENCOUNTER
Returned call to patient.  Two patient identifiers used. Patient stated her stitches are open on her breast. I asked if the wound was draining and or bleeding? Patient stated the wound is draining, I asked what color, patient stated like a brown color. Patient stated she can see the straw inside her wound, she stated something is not right and the wound needs to be fixed, and stated she is not going to be able to be awake for it to be done. Patient stated she cannot walk around with a gash in her breast. I asked patient if she can come in the office to have the provider look at the wound. Patient agreed and stated she can do 140 pm. Patient thanked for call.      staff made aware to add patient to schedule. Will make provider aware.

## 2024-05-29 NOTE — PROGRESS NOTES
Identified patient with two patient identifiers (name and ). Reviewed chart in preparation for visit and have obtained necessary documentation.    Carin Chacon is a 34 y.o. female  Chief Complaint   Patient presents with    Wound Check     /81 (Site: Left Upper Arm, Position: Sitting, Cuff Size: Large Adult)   Pulse 95   Temp 99.7 °F (37.6 °C) (Oral)   Resp 18   Ht 1.676 m (5' 6\")   Wt (!) 160.3 kg (353 lb 6.4 oz)   LMP 2024 (Exact Date)   SpO2 97%   BMI 57.04 kg/m²     1. Have you been to the ER, urgent care clinic since your last visit?  Hospitalized since your last visit?no    2. Have you seen or consulted any other health care providers outside of the [facility] since your last visit?  Include any pap smears or colon screening. no

## 2024-06-05 ENCOUNTER — TELEPHONE (OUTPATIENT)
Age: 34
End: 2024-06-05

## 2024-06-05 NOTE — TELEPHONE ENCOUNTER
Patient called stating her anxiety is to where she is not going to allow anyone to touch her today during her appt.

## 2024-06-05 NOTE — TELEPHONE ENCOUNTER
I called the patient after speaking to Dr Barraza and he said for her to cancel her appointment today with NP and he wants to see her in the office on Monday. She said the breast drain has fallen out but she still has the drain in at the back of her neck. I told her he is hoping that the drain will come out by Monday as well as he used dissolvable suture. I transferred her to the  to make her appointment. Pt in agreement.

## 2024-06-10 ENCOUNTER — TELEPHONE (OUTPATIENT)
Age: 34
End: 2024-06-10

## 2024-06-10 ENCOUNTER — OFFICE VISIT (OUTPATIENT)
Age: 34
End: 2024-06-10

## 2024-06-10 VITALS
SYSTOLIC BLOOD PRESSURE: 152 MMHG | OXYGEN SATURATION: 95 % | DIASTOLIC BLOOD PRESSURE: 86 MMHG | WEIGHT: 293 LBS | HEART RATE: 105 BPM | BODY MASS INDEX: 47.09 KG/M2 | HEIGHT: 66 IN | RESPIRATION RATE: 20 BRPM | TEMPERATURE: 98.7 F

## 2024-06-10 DIAGNOSIS — Z48.89 POSTOPERATIVE VISIT: Primary | ICD-10-CM

## 2024-06-10 PROCEDURE — 99024 POSTOP FOLLOW-UP VISIT: CPT | Performed by: SURGERY

## 2024-06-10 ASSESSMENT — PATIENT HEALTH QUESTIONNAIRE - PHQ9
8. MOVING OR SPEAKING SO SLOWLY THAT OTHER PEOPLE COULD HAVE NOTICED. OR THE OPPOSITE, BEING SO FIGETY OR RESTLESS THAT YOU HAVE BEEN MOVING AROUND A LOT MORE THAN USUAL: MORE THAN HALF THE DAYS
6. FEELING BAD ABOUT YOURSELF - OR THAT YOU ARE A FAILURE OR HAVE LET YOURSELF OR YOUR FAMILY DOWN: NEARLY EVERY DAY
9. THOUGHTS THAT YOU WOULD BE BETTER OFF DEAD, OR OF HURTING YOURSELF: NOT AT ALL
SUM OF ALL RESPONSES TO PHQ QUESTIONS 1-9: 22
SUM OF ALL RESPONSES TO PHQ QUESTIONS 1-9: 22
2. FEELING DOWN, DEPRESSED OR HOPELESS: NEARLY EVERY DAY
SUM OF ALL RESPONSES TO PHQ QUESTIONS 1-9: 22
1. LITTLE INTEREST OR PLEASURE IN DOING THINGS: NEARLY EVERY DAY
SUM OF ALL RESPONSES TO PHQ QUESTIONS 1-9: 22
5. POOR APPETITE OR OVEREATING: NEARLY EVERY DAY
SUM OF ALL RESPONSES TO PHQ9 QUESTIONS 1 & 2: 6
3. TROUBLE FALLING OR STAYING ASLEEP: NEARLY EVERY DAY
10. IF YOU CHECKED OFF ANY PROBLEMS, HOW DIFFICULT HAVE THESE PROBLEMS MADE IT FOR YOU TO DO YOUR WORK, TAKE CARE OF THINGS AT HOME, OR GET ALONG WITH OTHER PEOPLE: EXTREMELY DIFFICULT
7. TROUBLE CONCENTRATING ON THINGS, SUCH AS READING THE NEWSPAPER OR WATCHING TELEVISION: NEARLY EVERY DAY

## 2024-06-10 ASSESSMENT — COLUMBIA-SUICIDE SEVERITY RATING SCALE - C-SSRS
2. HAVE YOU ACTUALLY HAD ANY THOUGHTS OF KILLING YOURSELF?: NO
1. WITHIN THE PAST MONTH, HAVE YOU WISHED YOU WERE DEAD OR WISHED YOU COULD GO TO SLEEP AND NOT WAKE UP?: NO
6. HAVE YOU EVER DONE ANYTHING, STARTED TO DO ANYTHING, OR PREPARED TO DO ANYTHING TO END YOUR LIFE?: NO

## 2024-06-10 NOTE — PROGRESS NOTES
Identified patient with two patient identifiers (name and ). Reviewed chart in preparation for visit and have obtained necessary documentation.    Carin Chacon is a 34 y.o. female  Chief Complaint   Patient presents with    Post-Op Check     2 weeks s/p EXCISION OF HIDRADENITIS OF POSTERIOR NECK  EXCISION OF HIDRADENITIS OF RIGHT BREAST     BP (!) 152/86 (Site: Left Upper Arm, Position: Sitting, Cuff Size: Large Adult)   Pulse (!) 105   Temp 98.7 °F (37.1 °C) (Oral)   Resp 20   Ht 1.676 m (5' 6\")   Wt (!) 158.4 kg (349 lb 3.2 oz)   LMP 2024 (Exact Date)   SpO2 95%   BMI 56.36 kg/m²     1. Have you been to the ER, urgent care clinic since your last visit?  Hospitalized since your last visit?no    2. Have you seen or consulted any other health care providers outside of the [facility] since your last visit?  Include any pap smears or colon screening. noPatient and provider made aware of elevated BP x2. Patient asymptomatic. Patient reminded to monitor BP, continue to take BP medications if prescribed, and follow up with PCP/Cardiologist.  Patient expressed understanding and agreement.

## 2024-06-10 NOTE — TELEPHONE ENCOUNTER
Called patient in attempt to schedule her a 1 week follow up with Dr. Barraza on 6/17 at 9:00am. No answer, unable to leave a voicemail.

## 2024-06-14 NOTE — PROGRESS NOTES
Neck drain is still productive so will leave it in for now. The overlying skin looks a lot better and healthier.  Breast incision is open and draining.  Edges are starting to granulate nicely.  Return in a week.

## 2024-06-17 ENCOUNTER — OFFICE VISIT (OUTPATIENT)
Age: 34
End: 2024-06-17

## 2024-06-17 VITALS
HEIGHT: 66 IN | HEART RATE: 90 BPM | RESPIRATION RATE: 16 BRPM | SYSTOLIC BLOOD PRESSURE: 125 MMHG | OXYGEN SATURATION: 98 % | WEIGHT: 293 LBS | TEMPERATURE: 98.8 F | DIASTOLIC BLOOD PRESSURE: 84 MMHG | BODY MASS INDEX: 47.09 KG/M2

## 2024-06-17 DIAGNOSIS — L73.2 HIDRADENITIS SUPPURATIVA OF MULTIPLE SITES: ICD-10-CM

## 2024-06-17 DIAGNOSIS — Z48.89 POSTOPERATIVE VISIT: Primary | ICD-10-CM

## 2024-06-17 PROCEDURE — 99024 POSTOP FOLLOW-UP VISIT: CPT | Performed by: SURGERY

## 2024-06-17 RX ORDER — CEPHALEXIN 500 MG/1
500 CAPSULE ORAL 4 TIMES DAILY
Qty: 40 CAPSULE | Refills: 0 | Status: SHIPPED | OUTPATIENT
Start: 2024-06-17 | End: 2024-06-27

## 2024-06-17 ASSESSMENT — PATIENT HEALTH QUESTIONNAIRE - PHQ9
1. LITTLE INTEREST OR PLEASURE IN DOING THINGS: NOT AT ALL
SUM OF ALL RESPONSES TO PHQ QUESTIONS 1-9: 1
2. FEELING DOWN, DEPRESSED OR HOPELESS: SEVERAL DAYS
SUM OF ALL RESPONSES TO PHQ QUESTIONS 1-9: 1
SUM OF ALL RESPONSES TO PHQ9 QUESTIONS 1 & 2: 1
SUM OF ALL RESPONSES TO PHQ QUESTIONS 1-9: 1
SUM OF ALL RESPONSES TO PHQ QUESTIONS 1-9: 1

## 2024-06-17 NOTE — PROGRESS NOTES
Identified patient with two patient identifiers (name and ). Reviewed chart in preparation for visit and have obtained necessary documentation.    Carin Chacon is a 34 y.o. female  Chief Complaint   Patient presents with    Post-Op Check     S/p EXCISION OF HIDRADENITIS OF POSTERIOR NECK  EXCISION OF HIDRADENITIS OF RIGHT BREAST       /84 (Site: Right Upper Arm, Position: Sitting, Cuff Size: Large Adult)   Pulse 90   Temp 98.8 °F (37.1 °C) (Oral)   Resp 16   Ht 1.676 m (5' 6\")   Wt (!) 159.2 kg (351 lb)   LMP 2024 (Exact Date)   SpO2 98%   BMI 56.65 kg/m²     1. Have you been to the ER, urgent care clinic since your last visit?  Hospitalized since your last visit?no    2. Have you seen or consulted any other health care providers outside of the [facility] since your last visit?  Include any pap smears or colon screening. no

## 2024-06-21 ENCOUNTER — TELEPHONE (OUTPATIENT)
Age: 34
End: 2024-06-21

## 2024-06-21 DIAGNOSIS — L73.2 HIDRADENITIS: Primary | ICD-10-CM

## 2024-06-21 RX ORDER — OXYCODONE HYDROCHLORIDE AND ACETAMINOPHEN 5; 325 MG/1; MG/1
1 TABLET ORAL EVERY 4 HOURS PRN
Qty: 20 TABLET | Refills: 0 | Status: SHIPPED | OUTPATIENT
Start: 2024-06-21 | End: 2024-06-24

## 2024-06-21 NOTE — TELEPHONE ENCOUNTER
Returned call to patient.  Two patient identifiers used  Patient stated she would like a refill on the Oxycodone, she stated the tramadol is not really helping, she stated she has 3 tabs of the oxycodone. I asked patient to rate pain on scale on 1-10 patient stated a 11. Patient stated is currently laying on her stomach keep her neck still, she stated its hard to lay on her side and move neck. She stated she is still taking the antibiotics that was prescribed stated she is not sure if its strong enough. I asked patient if she tried ibuprofen and or tylenol, patient stated none of that works because her body is immune to it. I made patient aware the provider is out on vacation but I will speak with covering provider. Patient verbalized understanding and thanked for call.     I made Dr. Bates aware of message above, per Dr. Bates she will refill medication but if patient develops fever, chills, severe pain, and if infection gets worse then patient needs to be seen, would also like for patient to come in the office either today or next week .

## 2024-06-21 NOTE — TELEPHONE ENCOUNTER
Patient would like to see if she could get a refill on her pain medication. Patient stated she still has an infection on her neck.

## 2024-06-21 NOTE — TELEPHONE ENCOUNTER
I attempted to call patient back and received a automated message stated my call has been forwarded to a voice messaging system, the mail box is full and can't leave messages at this time.

## 2024-06-21 NOTE — TELEPHONE ENCOUNTER
Returned call to patient.  Two patient identifiers used. I made patient aware of provider message. Patient stated she can not come in today due not having a ride to get here, I offered patient a appt on 06/26/2024 patient stated she can do 1140 am to see Dr. Bates. I reiterated to patient if she develops a fever, severe pain then go to the nearest ER and call our office. Patient verbalized understanding and thanked for call.      staff made aware to add patient to schedule.

## 2024-06-24 ENCOUNTER — TELEPHONE (OUTPATIENT)
Age: 34
End: 2024-06-24

## 2024-06-24 NOTE — TELEPHONE ENCOUNTER
I called the pharmacy and spoke with José Miguel, two patient identifiers used. I made her aware the patient called stated she needed a PA for medication, I made her aware our office did not receive PA, José Miguel stated the patient insurance will only cover controlled substance for 14 days every 60 days. I asked how much was the medication out of pocket, she stated $24.02. José Miguel stated she will send over the PA.

## 2024-06-24 NOTE — TELEPHONE ENCOUNTER
I attempted to complete PA through cover my meds, information was not going through due to saying patient name was not correct, patient has two last names. I called the PA dept and spoke with Neelima, two patient identifiers used. I completed PA on phone, Neelima stated it can take up to 24 hours, our office will receive a fax on status of approval or denial. Case number is [id number].     I will call patient to make aware.

## 2024-06-24 NOTE — TELEPHONE ENCOUNTER
Patient stated that [facility] pharmacy sent over a medication authorization for the patients pain medication that was faxed last week. Patient stated the pharmacy still has not received the authorization back.

## 2024-06-25 NOTE — TELEPHONE ENCOUNTER
Returned call to patient.  Two patient identifiers used. I made patient aware the insurance denied coverage for her pain medication due to her insurance only covers pain meds per every 60 days. Patient asked how much was the pain medication, I made patient aware I spoke with Pharmacy yesterday and was told $24.02 Patient verbalized understanding and stated she will go take care of it today. Patient thanked for call.

## 2024-07-11 ENCOUNTER — TELEPHONE (OUTPATIENT)
Age: 34
End: 2024-07-11

## 2024-07-11 DIAGNOSIS — L73.2 HIDRADENITIS SUPPURATIVA OF MULTIPLE SITES: Primary | ICD-10-CM

## 2024-07-11 NOTE — TELEPHONE ENCOUNTER
Patient wanted to let Dr. Barraza know the drain tube has come out. Patient also stated Dr. Barraza did not actually remove the sack and now it's inflamed. Patient stated so it seems she went under surgery for no reason and it still needs to be removed. Stated she is draining as if she did not have the surgery at all.

## 2024-07-11 NOTE — TELEPHONE ENCOUNTER
Returned call to patient.  Two patient identifiers used. Patient stated she spoke with another specialist regarding the matter. Patient stated she would like to know exactly what Dr. Barraza did in surgery. Patient stated she would like to know if he removed the sac? She stated she was under the impression that he did but stated obviously he didn't. Patient stated she feels she underwent surgery for no reason, she stated both drains did come out on its own. She stated in the past when Dr. Frost did her surgery she did a good job, she stated the spot she operated on never came back, she stated now she had this surgery with Dr. Barraza she feels it was not done correctly, Patient stated this is putting her in a depression kind of state because she don't want to leave the house nor can't go to work by the area keeps draining and its hot which causes the area to smell funky, and can't work around food smelling like that. Patient stated she would like to know what are the next steps she stated people are talking about lawsuit but she just want the problem to be fixed. She stated if another visit is needed she would rather do it virtually because its no pont of coming in the office just to be told everything looks fine and also stated she is not threatening us but stated she is bipolar and she does not want to get disrespectful with anyone because she is frustrated.. She stated her whole summer was wasted because she has not been able to get into the pool. She state she us tired of wearing guaze and pads. I made patient aware I will make the provider aware of her concerns and make provider aware to give her a call. Patient verbalized understanding and thanked for call.

## 2024-07-12 NOTE — TELEPHONE ENCOUNTER
Spoke with her for about 5 minutes.  She is feeloing a lot better but still has drainage from the breast area and pain in the neck.  She will come in for a visit and told me she knows we will be able to get things better, even if it requires more surgery.  I tols her I would speak with Dr. Dyson as well and maybe have her see him for additional ideas. This was fine with her.

## 2024-07-22 ENCOUNTER — OFFICE VISIT (OUTPATIENT)
Age: 34
End: 2024-07-22

## 2024-07-22 VITALS
OXYGEN SATURATION: 98 % | TEMPERATURE: 98.9 F | DIASTOLIC BLOOD PRESSURE: 87 MMHG | HEART RATE: 98 BPM | HEIGHT: 66 IN | BODY MASS INDEX: 47.09 KG/M2 | RESPIRATION RATE: 16 BRPM | SYSTOLIC BLOOD PRESSURE: 135 MMHG | WEIGHT: 293 LBS

## 2024-07-22 VITALS
HEART RATE: 98 BPM | TEMPERATURE: 98.9 F | HEIGHT: 66 IN | WEIGHT: 293 LBS | RESPIRATION RATE: 16 BRPM | DIASTOLIC BLOOD PRESSURE: 87 MMHG | BODY MASS INDEX: 47.09 KG/M2 | OXYGEN SATURATION: 98 % | SYSTOLIC BLOOD PRESSURE: 135 MMHG

## 2024-07-22 DIAGNOSIS — L73.2 HIDRADENITIS SUPPURATIVA OF MULTIPLE SITES: Primary | ICD-10-CM

## 2024-07-22 PROCEDURE — 99024 POSTOP FOLLOW-UP VISIT: CPT | Performed by: SURGERY

## 2024-07-22 RX ORDER — BUPROPION HYDROCHLORIDE 150 MG/1
150 TABLET ORAL EVERY MORNING
COMMUNITY
Start: 2024-07-15 | End: 2024-07-23 | Stop reason: ALTCHOICE

## 2024-07-22 RX ORDER — BUTALBITAL, ACETAMINOPHEN AND CAFFEINE 50; 325; 40 MG/1; MG/1; MG/1
TABLET ORAL
COMMUNITY
Start: 2024-04-21

## 2024-07-22 RX ORDER — OXYCODONE HYDROCHLORIDE AND ACETAMINOPHEN 5; 325 MG/1; MG/1
TABLET ORAL
COMMUNITY
Start: 2024-06-25

## 2024-07-22 RX ORDER — ALPRAZOLAM 1 MG/1
1 TABLET ORAL 2 TIMES DAILY PRN
COMMUNITY
Start: 2024-07-15

## 2024-07-22 NOTE — PROGRESS NOTES
Carin Chacon is a 34 y.o. female who is referred by Dr. Barraza for further evaluation of hidradenitis of the posterior neck.    Information obtained from patient and review of chart.     Ms. Chacon is s/p excision and curretage of hidradenitis of the posterior neck and excision of hidradenitis of the right breast involving the areola on May 23, 2024. Still experiencing posterior neck pain and associated drainage as well as right breast pain. No h/o spiking fevers or shaking chills. No nausea or vomiting.   She has otherwise been in her usual state of health.     Past Medical History:   Diagnosis Date    ADD (attention deficit disorder) 2014    Aggressive outburst     Anxiety disorder     Arthritis     right ankle     Asthma     Bipolar depression (HCC)     Bipolar disorder (HCC) 2013    Chronic pain     back     Contact dermatitis and other eczema, due to unspecified cause     boil    Depression     Diabetes (HCC)     borderline/ no meds    Ectopic pregnancy, tubal     Essential hypertension     ON ONE OCCASION NO MEDS    Headache(784.0)     migraines    Hidradenitis suppurativa of multiple sites 2015    Ill-defined condition     anxiety disorder    Infertility, female     Cheyanne (HCC)     Mood disorder ([facility]) 2014    Morbid obesity (HCC)     Psychotic disorder (HCC)     Recurrent boils     Sickle cell disease ([facility])     trait    Sleep apnea 2017    has machine/not using    Sleep disorder     Substance abuse ([facility])     Suicidal thoughts     Syphilis      Past Surgical History:   Procedure Laterality Date     DELIVERY ONLY       SECTION  14     SECTION  2017    CYST REMOVAL  2017    Pilonidal cystectomy- Brandy Frost MD    HIDRADENITIS EXCISION N/A 2024    EXCISION OF HIDRADENITIS OF POSTERIOR NECK performed by Lorenzo Barraza MD at [facility] MAIN OR    HIDRADENITIS EXCISION Right 2024    EXCISION OF HIDRADENITIS OF RIGHT BREAST performed by Madi

## 2024-07-22 NOTE — PROGRESS NOTES
Identified patient with two patient identifiers (name and ). Reviewed chart in preparation for visit and have obtained necessary documentation.    Carin Chacon is a 34 y.o. female  Chief Complaint   Patient presents with    Follow-up     2 months s/p EXCISION OF HIDRADENITIS OF POSTERIOR NECK  EXCISION OF HIDRADENITIS OF RIGHT BREAST     /87 (Site: Right Upper Arm, Position: Sitting, Cuff Size: Large Adult)   Pulse 98   Temp 98.9 °F (37.2 °C) (Oral)   Resp 16   Ht 1.676 m (5' 6\")   Wt (!) 162.8 kg (359 lb)   SpO2 98%   BMI 57.94 kg/m²     1. Have you been to the ER, urgent care clinic since your last visit?  Hospitalized since your last visit?no    2. Have you seen or consulted any other health care providers outside of the [facility] since your last visit?  Include any pap smears or colon screening. no

## 2024-07-23 ENCOUNTER — PREP FOR PROCEDURE (OUTPATIENT)
Age: 34
End: 2024-07-23

## 2024-07-23 DIAGNOSIS — L73.2 HIDRADENITIS: ICD-10-CM

## 2024-07-24 RX ORDER — BUPIVACAINE HYDROCHLORIDE 2.5 MG/ML
30 INJECTION, SOLUTION EPIDURAL; INFILTRATION; INTRACAUDAL ONCE
Status: CANCELLED | OUTPATIENT
Start: 2024-07-24 | End: 2024-07-24

## 2024-07-24 RX ORDER — ACETAMINOPHEN 325 MG/1
1000 TABLET ORAL ONCE
Status: CANCELLED | OUTPATIENT
Start: 2024-07-24 | End: 2024-07-24

## 2024-08-09 ENCOUNTER — ANESTHESIA (OUTPATIENT)
Facility: HOSPITAL | Age: 34
End: 2024-08-09
Payer: MEDICAID

## 2024-08-09 ENCOUNTER — TELEPHONE (OUTPATIENT)
Age: 34
End: 2024-08-09

## 2024-08-09 ENCOUNTER — HOSPITAL ENCOUNTER (OUTPATIENT)
Facility: HOSPITAL | Age: 34
Setting detail: OUTPATIENT SURGERY
Discharge: HOME | End: 2024-08-09
Attending: SURGERY | Admitting: SURGERY
Payer: MEDICAID

## 2024-08-09 ENCOUNTER — ANESTHESIA EVENT (OUTPATIENT)
Facility: HOSPITAL | Age: 34
End: 2024-08-09
Payer: MEDICAID

## 2024-08-09 VITALS
RESPIRATION RATE: 18 BRPM | DIASTOLIC BLOOD PRESSURE: 78 MMHG | OXYGEN SATURATION: 97 % | BODY MASS INDEX: 47.09 KG/M2 | HEART RATE: 90 BPM | SYSTOLIC BLOOD PRESSURE: 140 MMHG | HEIGHT: 66 IN | WEIGHT: 293 LBS

## 2024-08-09 LAB — HCG UR QL: NEGATIVE

## 2024-08-09 PROCEDURE — 81025 URINE PREGNANCY TEST: CPT

## 2024-08-09 PROCEDURE — 2709999900 HC NON-CHARGEABLE SUPPLY: Performed by: SURGERY

## 2024-08-09 RX ORDER — SODIUM CHLORIDE 0.9 % (FLUSH) 0.9 %
5-40 SYRINGE (ML) INJECTION EVERY 12 HOURS SCHEDULED
Status: DISCONTINUED | OUTPATIENT
Start: 2024-08-09 | End: 2024-08-09 | Stop reason: HOSPADM

## 2024-08-09 RX ORDER — ONDANSETRON 2 MG/ML
4 INJECTION INTRAMUSCULAR; INTRAVENOUS
Status: CANCELLED | OUTPATIENT
Start: 2024-08-09 | End: 2024-08-10

## 2024-08-09 RX ORDER — SODIUM CHLORIDE 9 MG/ML
INJECTION, SOLUTION INTRAVENOUS PRN
Status: CANCELLED | OUTPATIENT
Start: 2024-08-09

## 2024-08-09 RX ORDER — ACETAMINOPHEN 500 MG
1000 TABLET ORAL ONCE
Status: DISCONTINUED | OUTPATIENT
Start: 2024-08-09 | End: 2024-08-09 | Stop reason: HOSPADM

## 2024-08-09 RX ORDER — SODIUM CHLORIDE 9 MG/ML
INJECTION, SOLUTION INTRAVENOUS PRN
Status: DISCONTINUED | OUTPATIENT
Start: 2024-08-09 | End: 2024-08-09 | Stop reason: HOSPADM

## 2024-08-09 RX ORDER — NALOXONE HYDROCHLORIDE 0.4 MG/ML
INJECTION, SOLUTION INTRAMUSCULAR; INTRAVENOUS; SUBCUTANEOUS PRN
Status: CANCELLED | OUTPATIENT
Start: 2024-08-09

## 2024-08-09 RX ORDER — ALBUTEROL SULFATE 90 UG/1
2 AEROSOL, METERED RESPIRATORY (INHALATION) EVERY 6 HOURS PRN
COMMUNITY

## 2024-08-09 RX ORDER — SODIUM CHLORIDE 0.9 % (FLUSH) 0.9 %
5-40 SYRINGE (ML) INJECTION PRN
Status: CANCELLED | OUTPATIENT
Start: 2024-08-09

## 2024-08-09 RX ORDER — HYDRALAZINE HYDROCHLORIDE 20 MG/ML
10 INJECTION INTRAMUSCULAR; INTRAVENOUS ONCE
Status: CANCELLED | OUTPATIENT
Start: 2024-08-09 | End: 2024-08-09

## 2024-08-09 RX ORDER — HYDROMORPHONE HYDROCHLORIDE 1 MG/ML
0.5 INJECTION, SOLUTION INTRAMUSCULAR; INTRAVENOUS; SUBCUTANEOUS EVERY 5 MIN PRN
Status: CANCELLED | OUTPATIENT
Start: 2024-08-09

## 2024-08-09 RX ORDER — MIDAZOLAM HYDROCHLORIDE 2 MG/2ML
2 INJECTION, SOLUTION INTRAMUSCULAR; INTRAVENOUS
Status: DISCONTINUED | OUTPATIENT
Start: 2024-08-09 | End: 2024-08-09 | Stop reason: HOSPADM

## 2024-08-09 RX ORDER — SODIUM CHLORIDE, SODIUM LACTATE, POTASSIUM CHLORIDE, CALCIUM CHLORIDE 600; 310; 30; 20 MG/100ML; MG/100ML; MG/100ML; MG/100ML
INJECTION, SOLUTION INTRAVENOUS CONTINUOUS
Status: DISCONTINUED | OUTPATIENT
Start: 2024-08-09 | End: 2024-08-09 | Stop reason: HOSPADM

## 2024-08-09 RX ORDER — SODIUM CHLORIDE 0.9 % (FLUSH) 0.9 %
5-40 SYRINGE (ML) INJECTION EVERY 12 HOURS SCHEDULED
Status: CANCELLED | OUTPATIENT
Start: 2024-08-09

## 2024-08-09 RX ORDER — PROCHLORPERAZINE EDISYLATE 5 MG/ML
5 INJECTION INTRAMUSCULAR; INTRAVENOUS
Status: CANCELLED | OUTPATIENT
Start: 2024-08-09 | End: 2024-08-10

## 2024-08-09 RX ORDER — FENTANYL CITRATE 50 UG/ML
25 INJECTION, SOLUTION INTRAMUSCULAR; INTRAVENOUS EVERY 5 MIN PRN
Status: CANCELLED | OUTPATIENT
Start: 2024-08-09

## 2024-08-09 RX ORDER — FENTANYL CITRATE 50 UG/ML
100 INJECTION, SOLUTION INTRAMUSCULAR; INTRAVENOUS
Status: DISCONTINUED | OUTPATIENT
Start: 2024-08-09 | End: 2024-08-09 | Stop reason: HOSPADM

## 2024-08-09 RX ORDER — OXYCODONE HYDROCHLORIDE 5 MG/1
5 TABLET ORAL
Status: CANCELLED | OUTPATIENT
Start: 2024-08-09 | End: 2024-08-10

## 2024-08-09 RX ORDER — SODIUM CHLORIDE 0.9 % (FLUSH) 0.9 %
5-40 SYRINGE (ML) INJECTION PRN
Status: DISCONTINUED | OUTPATIENT
Start: 2024-08-09 | End: 2024-08-09 | Stop reason: HOSPADM

## 2024-08-09 RX ORDER — ALBUTEROL SULFATE 0.63 MG/3ML
1 SOLUTION RESPIRATORY (INHALATION) EVERY 6 HOURS PRN
COMMUNITY

## 2024-08-09 RX ORDER — LIDOCAINE HYDROCHLORIDE 10 MG/ML
1 INJECTION, SOLUTION EPIDURAL; INFILTRATION; INTRACAUDAL; PERINEURAL
Status: DISCONTINUED | OUTPATIENT
Start: 2024-08-09 | End: 2024-08-09 | Stop reason: HOSPADM

## 2024-08-09 ASSESSMENT — PAIN SCALES - GENERAL: PAINLEVEL_OUTOF10: 0

## 2024-08-09 NOTE — ANESTHESIA PRE PROCEDURE
Department of Anesthesiology  Preprocedure Note       Name:  Carin Chacon   Age:  34 y.o.  :  1990                                          MRN:  [id number]         Date:  2024      Surgeon: Surgeon(s):  Juan J Dyson MD    Procedure: Procedure(s):  EXCISE HIDRADENITIS POSTERIOR NECK, APPLY ACELLUAR XENOGRAFT    Medications prior to admission:   Prior to Admission medications    Medication Sig Start Date End Date Taking? Authorizing Provider   ALPRAZolam (XANAX) 1 MG tablet Take 1 tablet by mouth 2 times daily as needed. 7/15/24   Kiarra Silva MD   oxyCODONE-acetaminophen (PERCOCET) 5-325 MG per tablet TAKE 1 TABLET BY MOUTH EVERY 4 HOURS AS NEEDED FOR PAIN TAKE LOWEST DOSE POSSIBLE TO MANAGE PAIN 24   Kiarra Silva MD   butalbital-acetaminophen-caffeine (FIORICET, ESGIC) -40 MG per tablet TAKE 1 TABLET BY MOUTH EVERY 6 HOURS AS NEEDED FOR HEADACHE 24   Kiarra Silva MD       Current medications:    No current facility-administered medications for this encounter.       Allergies:  No Known Allergies    Problem List:    Patient Active Problem List   Diagnosis Code   • Bipolar disorder ([facility]) F31.9   • Hyperglycemia R73.9   • Cellulitis L03.90   • Obesity, morbid, BMI 50 or higher ([facility]) E66.01   • Low back pain M54.50   • Pregnant Z34.90   • LANDY (obstructive sleep apnea) G47.33   • ADD (attention deficit disorder) F98.8   • Anemia D64.9   • Moderate persistent asthma with acute exacerbation J45.41   • Ventral hernia without obstruction or gangrene K43.9   • Hidradenitis suppurativa of multiple sites L73.2   • Pregnancy Z34.90   • Tobacco abuse Z72.0   • Hidradenitis L73.2       Past Medical History:        Diagnosis Date   • ADD (attention deficit disorder) 2014   • Aggressive outburst    • Anxiety disorder    • Arthritis     right ankle    • Asthma    • Bipolar depression ([facility])    • Bipolar disorder ([facility]) 2013   • Chronic pain     back    • Contact

## 2024-08-09 NOTE — TELEPHONE ENCOUNTER
Perfect serve message sent to provider with patients request.           Asthma  seasonal  Closed fracture of nasal bone, initial encounter

## 2024-08-09 NOTE — H&P
Date of Surgery Update:  Carin Chacon was seen and examined.  History and physical has been reviewed. The patient has been examined. There have been no significant clinical changes since the completion of the originally dated History and Physical.  Patient identified by surgeon; surgical site was confirmed by patient and surgeon.    Signed By: Juan J Dyson MD     2024 9:30 AM         Please note from the office and include the additional information below:    Past Medical History  Past Medical History:   Diagnosis Date    ADD (attention deficit disorder) 2014    Aggressive outburst     Anxiety disorder     Arthritis     right ankle     Asthma     Bipolar depression (HCC)     Bipolar disorder (HCC) 2013    Chronic pain     back     Contact dermatitis and other eczema, due to unspecified cause     boil    Depression     Diabetes (HCC)     borderline/ no meds    Ectopic pregnancy, tubal     Essential hypertension     ON ONE OCCASION NO MEDS    Headache(784.0)     migraines    Hidradenitis     NECK    Hidradenitis suppurativa of multiple sites 2015    Ill-defined condition     anxiety disorder    Infertility, female     Cheyanne (HCC)     Mood disorder (HCC) 2014    Morbid obesity (HCC)     Psychotic disorder (HCC)     Recurrent boils     Sickle cell disease (HCC)     trait    Sleep apnea 2017    has machine/not using    Sleep disorder     Substance abuse (HCC)     Suicidal thoughts     Syphilis         Past Surgical History  Past Surgical History:   Procedure Laterality Date    BREAST SURGERY Right 2024    HYDRADENITIS     DELIVERY ONLY       SECTION  14     SECTION  2017    CYST REMOVAL  2017    Pilonidal cystectomy- Brandy Frost MD    HIDRADENITIS EXCISION N/A 2024    EXCISION OF HIDRADENITIS OF POSTERIOR NECK performed by Lorenzo Barraza MD at [facility] MAIN OR    HIDRADENITIS EXCISION Right 2024    EXCISION OF HIDRADENITIS OF

## 2024-08-09 NOTE — TELEPHONE ENCOUNTER
Contacted patient to reschedule surgery with Dr. Dyson. Offered patient 8/23 and patient accepted. Notified her that surgery would start at 1:30pm but Dr. Dyson wants her at [facility] at 9am. Patient stated that she wanted to know if Dr. Dyson could call in pain medication for her neck and also some antibiotics to get her through to her surgery on 8/23. Notified patient that I will send a message to the nurse to have them call her. Also notified patient that [facility] to her home address. Patient thanked me for the call.

## 2024-08-14 DIAGNOSIS — L73.2 HIDRADENITIS: Primary | ICD-10-CM

## 2024-08-14 RX ORDER — OXYCODONE HYDROCHLORIDE AND ACETAMINOPHEN 5; 325 MG/1; MG/1
1 TABLET ORAL
Qty: 6 TABLET | Refills: 0 | OUTPATIENT
Start: 2024-08-14

## 2024-08-14 RX ORDER — OXYCODONE HYDROCHLORIDE 5 MG/1
5 TABLET ORAL EVERY 4 HOURS PRN
Qty: 6 TABLET | Refills: 0 | Status: SHIPPED | OUTPATIENT
Start: 2024-08-14 | End: 2024-08-17

## 2024-08-14 NOTE — TELEPHONE ENCOUNTER
I made provider aware of message. Per provider patient don't need antibiotics but will prescribe a half dozen of pain medications.       Returned call to patient.  Two patient identifiers used. I made patient aware of message above and confirmed pharmacy. Patient verbalized understanding and thanked for call.

## 2024-08-14 NOTE — TELEPHONE ENCOUNTER
Patient is wondering if her medication has been called in. She called last week and never heard back about it.

## 2024-08-19 NOTE — PERIOP NOTE
PATIENT INSTRUCTED SHE MAY DRINK 12 OZ OF CLEAR LIQUIDS EVERY 4 HOURS UP UNTIL 1 HOUR PRIOR TO SURGERY. PATIENT WAS GIVEN OPPORTUNITY TO ASK QUESTIONS. SHE VOICES UNDERSTANDING.    AUTUMN IN OR MADE AWARE OF PATIENTS STATED WEIGHT 360 LB

## 2024-08-19 NOTE — PERIOP NOTE
[address] [address]   MAIN OR                                  [phone]    MAIN PRE OP             [phone]                                                                                AMBULATORY PRE OP          [phone]  PRE-ADMISSION TESTING    [phone]     Surgery Date:  8-23-24       Is surgery arrival time given by surgeon?  YES  NO    If “NO”, [facility] staff will call you between 4 and 7pm the day before your surgery with your arrival time. (If your surgery is on a Monday, we will call you the Friday before.)    Call [phone] after 7pm Monday-Friday if you did not receive this call.    INSTRUCTIONS BEFORE YOUR SURGERY   When You  Arrive Arrive at [facility] Patient Access on 1st floor the day of your surgery.   Medications to   TAKE   Morning of Surgery MEDICATIONS TO TAKE THE MORNING OF SURGERY WITH A SIP OF WATER: XANAX,    You may take these medications, IF NEEDED, the morning of surgery: OXYCODONE NO LATER THAN 4 HOURS PRIOR TO ARRIVAL DAY OF SURGERY. ALBUTEROL ACCUNEB OR ALBUTEROL INHALER.   Ask your surgeon/prescribing doctor for instructions on taking or stopping these medications prior to surgery: NONE   Medications to STOP  before surgery Non-Steroidal anti-inflammatory Drugs (NSAID's): for example, Diclofenac (Voltaren), Ibuprofen (Advil, Motrin), Naproxen (Aleve) 3 days  STOP all herbal supplements and vitamins(unless prescribed by your doctor), and fish oil for 7 days  Other: BRING INHALER TO THE HOSPITAL DAY OF SURGERY.  (Pain medications not listed above, including Tylenol may be taken up until 4 hours prior to arrival time)   Blood  Thinners If you take Aspirin, Plavix, Coumadin, or any blood-thinning or anti-blood clot medicine, talk to the doctor who prescribed the medications for pre-operative instructions.  If you take aspirin or aspirin containing products for pain, stop 7 days prior to surgery

## 2024-08-22 ENCOUNTER — TELEPHONE (OUTPATIENT)
Age: 34
End: 2024-08-22

## 2024-08-22 NOTE — TELEPHONE ENCOUNTER
Used 2 patient identifiers, patient called in regards to bringing cellphone to surgery tomorrow 8/23/24. Patient wants to confirm with nurse(s) that she is allowed to have phone while waiting until surgery begins.

## 2024-08-22 NOTE — TELEPHONE ENCOUNTER
Patient needs a letter to excuse her from work and/or school for the next two days. Patient will come . Please call when ready.

## 2024-08-22 NOTE — TELEPHONE ENCOUNTER
Patient identified with two patient identifiers.  Patient states she is currently in school and need letter starting today until Tuesday.  Informed letter will be completed and she will  from  [facility] location.  Patient instructed to follow up with us Monday if any issues regarding return to class on Tuesday.

## 2024-08-22 NOTE — TELEPHONE ENCOUNTER
Patient identified with two patient identifiers.  Patient states she is requesting to keep her phone until she is about to be wheeled back for surgery.  Patient informed nurse in holding will update on personals and cell policy.  Patient informed provider will come see her in holding prior to procedure.  Patient expressed understanding.

## 2024-08-23 ENCOUNTER — ANESTHESIA EVENT (OUTPATIENT)
Facility: HOSPITAL | Age: 34
End: 2024-08-23
Payer: MEDICAID

## 2024-08-23 ENCOUNTER — ANESTHESIA (OUTPATIENT)
Facility: HOSPITAL | Age: 34
End: 2024-08-23
Payer: MEDICAID

## 2024-08-23 ENCOUNTER — TELEPHONE (OUTPATIENT)
Age: 34
End: 2024-08-23

## 2024-08-23 ENCOUNTER — HOSPITAL ENCOUNTER (OUTPATIENT)
Facility: HOSPITAL | Age: 34
Setting detail: OUTPATIENT SURGERY
Discharge: HOME | End: 2024-08-23
Attending: SURGERY | Admitting: SURGERY
Payer: MEDICAID

## 2024-08-23 VITALS
WEIGHT: 293 LBS | DIASTOLIC BLOOD PRESSURE: 85 MMHG | HEIGHT: 66 IN | RESPIRATION RATE: 18 BRPM | OXYGEN SATURATION: 93 % | TEMPERATURE: 97.5 F | SYSTOLIC BLOOD PRESSURE: 137 MMHG | HEART RATE: 80 BPM | BODY MASS INDEX: 47.09 KG/M2

## 2024-08-23 DIAGNOSIS — L73.2 HIDRADENITIS: ICD-10-CM

## 2024-08-23 DIAGNOSIS — L73.2 HIDRADENITIS: Primary | ICD-10-CM

## 2024-08-23 DIAGNOSIS — L73.2 HIDRADENITIS SUPPURATIVA OF MULTIPLE SITES: Primary | ICD-10-CM

## 2024-08-23 LAB
GLUCOSE BLD STRIP.AUTO-MCNC: 125 MG/DL (ref 65–117)
GLUCOSE BLD STRIP.AUTO-MCNC: 147 MG/DL (ref 65–117)
HCG UR QL: NEGATIVE
SERVICE CMNT-IMP: ABNORMAL
SERVICE CMNT-IMP: ABNORMAL

## 2024-08-23 PROCEDURE — 2709999900 HC NON-CHARGEABLE SUPPLY: Performed by: SURGERY

## 2024-08-23 PROCEDURE — 82962 GLUCOSE BLOOD TEST: CPT

## 2024-08-23 PROCEDURE — 6360000002 HC RX W HCPCS: Performed by: ANESTHESIOLOGY

## 2024-08-23 PROCEDURE — 7100000000 HC PACU RECOVERY - FIRST 15 MIN: Performed by: SURGERY

## 2024-08-23 PROCEDURE — 7100000001 HC PACU RECOVERY - ADDTL 15 MIN: Performed by: SURGERY

## 2024-08-23 PROCEDURE — 7100000010 HC PHASE II RECOVERY - FIRST 15 MIN: Performed by: SURGERY

## 2024-08-23 PROCEDURE — 3600000002 HC SURGERY LEVEL 2 BASE: Performed by: SURGERY

## 2024-08-23 PROCEDURE — 3700000000 HC ANESTHESIA ATTENDED CARE: Performed by: SURGERY

## 2024-08-23 PROCEDURE — 3700000001 HC ADD 15 MINUTES (ANESTHESIA): Performed by: SURGERY

## 2024-08-23 PROCEDURE — 88304 TISSUE EXAM BY PATHOLOGIST: CPT

## 2024-08-23 PROCEDURE — 2580000003 HC RX 258: Performed by: ANESTHESIOLOGY

## 2024-08-23 PROCEDURE — 6370000000 HC RX 637 (ALT 250 FOR IP): Performed by: ANESTHESIOLOGY

## 2024-08-23 PROCEDURE — 81025 URINE PREGNANCY TEST: CPT

## 2024-08-23 PROCEDURE — 2500000003 HC RX 250 WO HCPCS: Performed by: SURGERY

## 2024-08-23 PROCEDURE — 2500000003 HC RX 250 WO HCPCS: Performed by: NURSE ANESTHETIST, CERTIFIED REGISTERED

## 2024-08-23 PROCEDURE — 6360000002 HC RX W HCPCS: Performed by: NURSE ANESTHETIST, CERTIFIED REGISTERED

## 2024-08-23 PROCEDURE — 3600000012 HC SURGERY LEVEL 2 ADDTL 15MIN: Performed by: SURGERY

## 2024-08-23 DEVICE — CYTAL® WOUND MATRIX 6-LAYER, 7CM X 10CM
Type: IMPLANTABLE DEVICE | Site: NECK | Status: FUNCTIONAL
Brand: CYTAL®

## 2024-08-23 DEVICE — MICROMATRIX® UBM STANDARD PARTICULATE, 1000MG
Type: IMPLANTABLE DEVICE | Site: NECK | Status: FUNCTIONAL
Brand: MICROMATRIX®

## 2024-08-23 RX ORDER — OXYCODONE HYDROCHLORIDE 5 MG/1
10 TABLET ORAL EVERY 4 HOURS PRN
Status: CANCELLED | OUTPATIENT
Start: 2024-08-23

## 2024-08-23 RX ORDER — SODIUM CHLORIDE 0.9 % (FLUSH) 0.9 %
5-40 SYRINGE (ML) INJECTION PRN
Status: DISCONTINUED | OUTPATIENT
Start: 2024-08-23 | End: 2024-08-23 | Stop reason: HOSPADM

## 2024-08-23 RX ORDER — ALPRAZOLAM 1 MG
1 TABLET ORAL 2 TIMES DAILY
Status: CANCELLED | OUTPATIENT
Start: 2024-08-23

## 2024-08-23 RX ORDER — SODIUM CHLORIDE 0.9 % (FLUSH) 0.9 %
5-40 SYRINGE (ML) INJECTION EVERY 12 HOURS SCHEDULED
Status: DISCONTINUED | OUTPATIENT
Start: 2024-08-23 | End: 2024-08-23 | Stop reason: HOSPADM

## 2024-08-23 RX ORDER — ACETAMINOPHEN 500 MG
1000 TABLET ORAL EVERY 6 HOURS PRN
Qty: 30 TABLET | Refills: 2 | Status: SHIPPED | OUTPATIENT
Start: 2024-08-23

## 2024-08-23 RX ORDER — SODIUM CHLORIDE 9 MG/ML
INJECTION, SOLUTION INTRAVENOUS PRN
Status: DISCONTINUED | OUTPATIENT
Start: 2024-08-23 | End: 2024-08-23 | Stop reason: HOSPADM

## 2024-08-23 RX ORDER — SUCCINYLCHOLINE/SOD CL,ISO/PF 200MG/10ML
SYRINGE (ML) INTRAVENOUS PRN
Status: DISCONTINUED | OUTPATIENT
Start: 2024-08-23 | End: 2024-08-23 | Stop reason: SDUPTHER

## 2024-08-23 RX ORDER — CEFAZOLIN SODIUM 1 G/3ML
INJECTION, POWDER, FOR SOLUTION INTRAMUSCULAR; INTRAVENOUS PRN
Status: DISCONTINUED | OUTPATIENT
Start: 2024-08-23 | End: 2024-08-23 | Stop reason: SDUPTHER

## 2024-08-23 RX ORDER — OXYCODONE HYDROCHLORIDE 5 MG/1
5 TABLET ORAL
Status: DISCONTINUED | OUTPATIENT
Start: 2024-08-23 | End: 2024-08-23 | Stop reason: HOSPADM

## 2024-08-23 RX ORDER — DEXMEDETOMIDINE HYDROCHLORIDE 100 UG/ML
INJECTION, SOLUTION INTRAVENOUS PRN
Status: DISCONTINUED | OUTPATIENT
Start: 2024-08-23 | End: 2024-08-23 | Stop reason: SDUPTHER

## 2024-08-23 RX ORDER — SODIUM CHLORIDE 0.9 % (FLUSH) 0.9 %
5-40 SYRINGE (ML) INJECTION EVERY 12 HOURS SCHEDULED
Status: CANCELLED | OUTPATIENT
Start: 2024-08-23

## 2024-08-23 RX ORDER — MIDAZOLAM HYDROCHLORIDE 2 MG/2ML
2 INJECTION, SOLUTION INTRAMUSCULAR; INTRAVENOUS
Status: COMPLETED | OUTPATIENT
Start: 2024-08-23 | End: 2024-08-23

## 2024-08-23 RX ORDER — SODIUM CHLORIDE, SODIUM LACTATE, POTASSIUM CHLORIDE, CALCIUM CHLORIDE 600; 310; 30; 20 MG/100ML; MG/100ML; MG/100ML; MG/100ML
INJECTION, SOLUTION INTRAVENOUS CONTINUOUS
Status: DISCONTINUED | OUTPATIENT
Start: 2024-08-23 | End: 2024-08-23 | Stop reason: HOSPADM

## 2024-08-23 RX ORDER — ACETAMINOPHEN 500 MG
1000 TABLET ORAL ONCE
Status: COMPLETED | OUTPATIENT
Start: 2024-08-23 | End: 2024-08-23

## 2024-08-23 RX ORDER — SODIUM CHLORIDE 9 MG/ML
INJECTION, SOLUTION INTRAVENOUS PRN
Status: CANCELLED | OUTPATIENT
Start: 2024-08-23

## 2024-08-23 RX ORDER — FENTANYL CITRATE 50 UG/ML
100 INJECTION, SOLUTION INTRAMUSCULAR; INTRAVENOUS
Status: DISCONTINUED | OUTPATIENT
Start: 2024-08-23 | End: 2024-08-23 | Stop reason: HOSPADM

## 2024-08-23 RX ORDER — HYDROMORPHONE HYDROCHLORIDE 1 MG/ML
0.5 INJECTION, SOLUTION INTRAMUSCULAR; INTRAVENOUS; SUBCUTANEOUS EVERY 5 MIN PRN
Status: DISCONTINUED | OUTPATIENT
Start: 2024-08-23 | End: 2024-08-23 | Stop reason: HOSPADM

## 2024-08-23 RX ORDER — SODIUM CHLORIDE 0.9 % (FLUSH) 0.9 %
5-40 SYRINGE (ML) INJECTION PRN
Status: CANCELLED | OUTPATIENT
Start: 2024-08-23

## 2024-08-23 RX ORDER — LIDOCAINE HYDROCHLORIDE 20 MG/ML
INJECTION, SOLUTION EPIDURAL; INFILTRATION; INTRACAUDAL; PERINEURAL PRN
Status: DISCONTINUED | OUTPATIENT
Start: 2024-08-23 | End: 2024-08-23 | Stop reason: SDUPTHER

## 2024-08-23 RX ORDER — BUPIVACAINE HYDROCHLORIDE AND EPINEPHRINE 2.5; 5 MG/ML; UG/ML
INJECTION, SOLUTION EPIDURAL; INFILTRATION; INTRACAUDAL; PERINEURAL PRN
Status: DISCONTINUED | OUTPATIENT
Start: 2024-08-23 | End: 2024-08-23 | Stop reason: HOSPADM

## 2024-08-23 RX ORDER — DEXAMETHASONE SODIUM PHOSPHATE 4 MG/ML
INJECTION, SOLUTION INTRA-ARTICULAR; INTRALESIONAL; INTRAMUSCULAR; INTRAVENOUS; SOFT TISSUE PRN
Status: DISCONTINUED | OUTPATIENT
Start: 2024-08-23 | End: 2024-08-23 | Stop reason: SDUPTHER

## 2024-08-23 RX ORDER — MIDAZOLAM HYDROCHLORIDE 1 MG/ML
INJECTION INTRAMUSCULAR; INTRAVENOUS PRN
Status: DISCONTINUED | OUTPATIENT
Start: 2024-08-23 | End: 2024-08-23 | Stop reason: SDUPTHER

## 2024-08-23 RX ORDER — HYDRALAZINE HYDROCHLORIDE 20 MG/ML
10 INJECTION INTRAMUSCULAR; INTRAVENOUS
Status: DISCONTINUED | OUTPATIENT
Start: 2024-08-23 | End: 2024-08-23 | Stop reason: HOSPADM

## 2024-08-23 RX ORDER — PROCHLORPERAZINE EDISYLATE 5 MG/ML
5 INJECTION INTRAMUSCULAR; INTRAVENOUS
Status: DISCONTINUED | OUTPATIENT
Start: 2024-08-23 | End: 2024-08-23 | Stop reason: HOSPADM

## 2024-08-23 RX ORDER — SODIUM CHLORIDE, SODIUM LACTATE, POTASSIUM CHLORIDE, CALCIUM CHLORIDE 600; 310; 30; 20 MG/100ML; MG/100ML; MG/100ML; MG/100ML
INJECTION, SOLUTION INTRAVENOUS CONTINUOUS
Status: CANCELLED | OUTPATIENT
Start: 2024-08-23

## 2024-08-23 RX ORDER — ONDANSETRON 4 MG/1
4 TABLET, ORALLY DISINTEGRATING ORAL EVERY 6 HOURS PRN
Status: CANCELLED | OUTPATIENT
Start: 2024-08-23

## 2024-08-23 RX ORDER — OXYCODONE HYDROCHLORIDE 5 MG/1
5 TABLET ORAL EVERY 4 HOURS PRN
Qty: 12 TABLET | Refills: 0 | Status: SHIPPED | OUTPATIENT
Start: 2024-08-23 | End: 2024-08-26

## 2024-08-23 RX ORDER — PHENYLEPHRINE HCL IN 0.9% NACL 0.4MG/10ML
SYRINGE (ML) INTRAVENOUS PRN
Status: DISCONTINUED | OUTPATIENT
Start: 2024-08-23 | End: 2024-08-23 | Stop reason: SDUPTHER

## 2024-08-23 RX ORDER — FENTANYL CITRATE 50 UG/ML
INJECTION, SOLUTION INTRAMUSCULAR; INTRAVENOUS PRN
Status: DISCONTINUED | OUTPATIENT
Start: 2024-08-23 | End: 2024-08-23 | Stop reason: SDUPTHER

## 2024-08-23 RX ORDER — ONDANSETRON 2 MG/ML
INJECTION INTRAMUSCULAR; INTRAVENOUS PRN
Status: DISCONTINUED | OUTPATIENT
Start: 2024-08-23 | End: 2024-08-23 | Stop reason: SDUPTHER

## 2024-08-23 RX ORDER — LIDOCAINE HYDROCHLORIDE 10 MG/ML
1 INJECTION, SOLUTION EPIDURAL; INFILTRATION; INTRACAUDAL; PERINEURAL
Status: DISCONTINUED | OUTPATIENT
Start: 2024-08-23 | End: 2024-08-23 | Stop reason: HOSPADM

## 2024-08-23 RX ORDER — ONDANSETRON 2 MG/ML
4 INJECTION INTRAMUSCULAR; INTRAVENOUS
Status: DISCONTINUED | OUTPATIENT
Start: 2024-08-23 | End: 2024-08-23 | Stop reason: HOSPADM

## 2024-08-23 RX ORDER — ROCURONIUM BROMIDE 10 MG/ML
INJECTION, SOLUTION INTRAVENOUS PRN
Status: DISCONTINUED | OUTPATIENT
Start: 2024-08-23 | End: 2024-08-23 | Stop reason: SDUPTHER

## 2024-08-23 RX ORDER — ALBUTEROL SULFATE 90 UG/1
2 AEROSOL, METERED RESPIRATORY (INHALATION) EVERY 6 HOURS PRN
Status: CANCELLED | OUTPATIENT
Start: 2024-08-23

## 2024-08-23 RX ORDER — MORPHINE SULFATE 4 MG/ML
4 INJECTION, SOLUTION INTRAMUSCULAR; INTRAVENOUS EVERY 4 HOURS PRN
Status: CANCELLED | OUTPATIENT
Start: 2024-08-23

## 2024-08-23 RX ORDER — NALOXONE HYDROCHLORIDE 0.4 MG/ML
INJECTION, SOLUTION INTRAMUSCULAR; INTRAVENOUS; SUBCUTANEOUS PRN
Status: DISCONTINUED | OUTPATIENT
Start: 2024-08-23 | End: 2024-08-23 | Stop reason: HOSPADM

## 2024-08-23 RX ORDER — ACETAMINOPHEN 500 MG
1000 TABLET ORAL EVERY 6 HOURS
Status: CANCELLED | OUTPATIENT
Start: 2024-08-23

## 2024-08-23 RX ORDER — OXYCODONE HYDROCHLORIDE 5 MG/1
5 TABLET ORAL EVERY 4 HOURS PRN
Status: CANCELLED | OUTPATIENT
Start: 2024-08-23

## 2024-08-23 RX ORDER — ENOXAPARIN SODIUM 100 MG/ML
40 INJECTION SUBCUTANEOUS 2 TIMES DAILY
Status: CANCELLED | OUTPATIENT
Start: 2024-08-23

## 2024-08-23 RX ORDER — FENTANYL CITRATE 50 UG/ML
25 INJECTION, SOLUTION INTRAMUSCULAR; INTRAVENOUS EVERY 5 MIN PRN
Status: DISCONTINUED | OUTPATIENT
Start: 2024-08-23 | End: 2024-08-23 | Stop reason: HOSPADM

## 2024-08-23 RX ADMIN — Medication 200 MG: at 12:39

## 2024-08-23 RX ADMIN — PROPOFOL 100 MG: 10 INJECTION, EMULSION INTRAVENOUS at 12:47

## 2024-08-23 RX ADMIN — DEXMEDETOMIDINE HYDROCHLORIDE 5 MCG: 100 INJECTION, SOLUTION, CONCENTRATE INTRAVENOUS at 13:31

## 2024-08-23 RX ADMIN — Medication 80 MCG: at 13:53

## 2024-08-23 RX ADMIN — ONDANSETRON 4 MG: 2 INJECTION INTRAMUSCULAR; INTRAVENOUS at 12:55

## 2024-08-23 RX ADMIN — ROCURONIUM BROMIDE 10 MG: 10 SOLUTION INTRAVENOUS at 12:38

## 2024-08-23 RX ADMIN — MIDAZOLAM HYDROCHLORIDE 1 MG: 1 INJECTION, SOLUTION INTRAMUSCULAR; INTRAVENOUS at 12:38

## 2024-08-23 RX ADMIN — ACETAMINOPHEN 1000 MG: 500 TABLET ORAL at 11:03

## 2024-08-23 RX ADMIN — DEXMEDETOMIDINE HYDROCHLORIDE 5 MCG: 100 INJECTION, SOLUTION, CONCENTRATE INTRAVENOUS at 13:40

## 2024-08-23 RX ADMIN — FENTANYL CITRATE 25 MCG: 50 INJECTION INTRAMUSCULAR; INTRAVENOUS at 15:06

## 2024-08-23 RX ADMIN — CEFAZOLIN 3 G: 1 INJECTION, POWDER, FOR SOLUTION INTRAMUSCULAR; INTRAVENOUS at 13:00

## 2024-08-23 RX ADMIN — PROPOFOL 50 MG: 10 INJECTION, EMULSION INTRAVENOUS at 13:23

## 2024-08-23 RX ADMIN — DEXMEDETOMIDINE HYDROCHLORIDE 5 MCG: 100 INJECTION, SOLUTION, CONCENTRATE INTRAVENOUS at 12:45

## 2024-08-23 RX ADMIN — Medication 80 MCG: at 13:12

## 2024-08-23 RX ADMIN — HYDROMORPHONE HYDROCHLORIDE 0.5 MG: 1 INJECTION, SOLUTION INTRAMUSCULAR; INTRAVENOUS; SUBCUTANEOUS at 13:38

## 2024-08-23 RX ADMIN — FENTANYL CITRATE 50 MCG: 50 INJECTION, SOLUTION INTRAMUSCULAR; INTRAVENOUS at 12:38

## 2024-08-23 RX ADMIN — DEXMEDETOMIDINE HYDROCHLORIDE 5 MCG: 100 INJECTION, SOLUTION, CONCENTRATE INTRAVENOUS at 13:23

## 2024-08-23 RX ADMIN — HYDROMORPHONE HYDROCHLORIDE 0.5 MG: 1 INJECTION, SOLUTION INTRAMUSCULAR; INTRAVENOUS; SUBCUTANEOUS at 14:04

## 2024-08-23 RX ADMIN — DEXAMETHASONE SODIUM PHOSPHATE 8 MG: 4 INJECTION, SOLUTION INTRAMUSCULAR; INTRAVENOUS at 12:55

## 2024-08-23 RX ADMIN — DEXMEDETOMIDINE HYDROCHLORIDE 5 MCG: 100 INJECTION, SOLUTION, CONCENTRATE INTRAVENOUS at 12:38

## 2024-08-23 RX ADMIN — Medication 40 MCG: at 13:55

## 2024-08-23 RX ADMIN — SODIUM CHLORIDE, POTASSIUM CHLORIDE, SODIUM LACTATE AND CALCIUM CHLORIDE: 600; 310; 30; 20 INJECTION, SOLUTION INTRAVENOUS at 10:25

## 2024-08-23 RX ADMIN — MIDAZOLAM HYDROCHLORIDE 4 MG: 1 INJECTION, SOLUTION INTRAMUSCULAR; INTRAVENOUS at 12:32

## 2024-08-23 RX ADMIN — FENTANYL CITRATE 25 MCG: 50 INJECTION INTRAMUSCULAR; INTRAVENOUS at 14:49

## 2024-08-23 RX ADMIN — DEXMEDETOMIDINE HYDROCHLORIDE 5 MCG: 100 INJECTION, SOLUTION, CONCENTRATE INTRAVENOUS at 13:24

## 2024-08-23 RX ADMIN — DEXMEDETOMIDINE HYDROCHLORIDE 5 MCG: 100 INJECTION, SOLUTION, CONCENTRATE INTRAVENOUS at 13:28

## 2024-08-23 RX ADMIN — LIDOCAINE HYDROCHLORIDE 100 MG: 20 INJECTION, SOLUTION EPIDURAL; INFILTRATION; INTRACAUDAL; PERINEURAL at 12:38

## 2024-08-23 RX ADMIN — Medication 80 MCG: at 13:21

## 2024-08-23 RX ADMIN — MIDAZOLAM 2 MG: 1 INJECTION INTRAMUSCULAR; INTRAVENOUS at 11:03

## 2024-08-23 RX ADMIN — DEXMEDETOMIDINE HYDROCHLORIDE 5 MCG: 100 INJECTION, SOLUTION, CONCENTRATE INTRAVENOUS at 13:36

## 2024-08-23 RX ADMIN — PROPOFOL 300 MG: 10 INJECTION, EMULSION INTRAVENOUS at 12:38

## 2024-08-23 RX ADMIN — FENTANYL CITRATE 50 MCG: 50 INJECTION, SOLUTION INTRAMUSCULAR; INTRAVENOUS at 13:23

## 2024-08-23 ASSESSMENT — PAIN DESCRIPTION - DESCRIPTORS
DESCRIPTORS: STABBING
DESCRIPTORS: SORE

## 2024-08-23 ASSESSMENT — PAIN DESCRIPTION - LOCATION: LOCATION: NECK

## 2024-08-23 ASSESSMENT — PAIN - FUNCTIONAL ASSESSMENT: PAIN_FUNCTIONAL_ASSESSMENT: 0-10

## 2024-08-23 ASSESSMENT — PAIN SCALES - GENERAL
PAINLEVEL_OUTOF10: 5
PAINLEVEL_OUTOF10: 5
PAINLEVEL_OUTOF10: 4

## 2024-08-23 ASSESSMENT — PAIN DESCRIPTION - PAIN TYPE: TYPE: SURGICAL PAIN

## 2024-08-23 ASSESSMENT — PAIN DESCRIPTION - ORIENTATION: ORIENTATION: POSTERIOR

## 2024-08-23 ASSESSMENT — PAIN DESCRIPTION - ONSET: ONSET: AWAKENED FROM SLEEP

## 2024-08-23 NOTE — TELEPHONE ENCOUNTER
Received reply from [facility] they are unable to accept patient for care at this time.      Order faxed to alternate agency [facility]  with confirmation.   Called [facility] to confirm receipt voicemail left for return call.

## 2024-08-23 NOTE — H&P
Ms. Chacon has no c/o today.  Afebrile VSS  No intake or output data in the 24 hours ending 08/23/24 1219   Exam: Neck: No change in the hidradenitis of the posterior neck.  Cor: RRR.              Lungs: Bilateral breath sounds.               Abd: Soft. Non distended.             Non tender.             No guarding or rebound.  Labs:   Recent Results (from the past 12 hour(s))   POCT Glucose    Collection Time: 08/23/24 10:26 AM   Result Value Ref Range    POC Glucose 125 (H) 65 - 117 mg/dL    Performed by: GLENROY Tang    To OR for excision of hidradenitis of the posterior neck and application of acellular xenograft. Discussed procedure with her including risks of bleeding, infection, recurrence. Also discussed the role of the acellular xenograft.   She understands and wishes to proceed.   Consent on chart.   Surgical site marked.   NPO for now.

## 2024-08-23 NOTE — PROGRESS NOTES
Home health order place per provider to start Monday 8/26/24.  Request sent to [facility].

## 2024-08-23 NOTE — PROGRESS NOTES
1520 VS stable. Awake and alert. Resting comfortably. Patient denies nausea. No signs of excessive bleeding. Tolerating ice chips without difficulty. Ready to transferto Phase 2.

## 2024-08-23 NOTE — ANESTHESIA POSTPROCEDURE EVALUATION
Post-Anesthesia Evaluation and Assessment    Patient: Carin Chacon MRN: [id number]  SSN: [id number]    YOB: 1990  Age: 34 y.o.  Sex: female      I have evaluated the patient and they are stable and ready for discharge from the PACU.     Cardiovascular Function/Vital Signs  Visit Vitals  /79   Pulse 80   Temp 97.7 °F (36.5 °C) (Axillary)   Resp 16   Ht 1.676 m (5' 6\")   Wt (!) 163.3 kg (360 lb)   SpO2 100%   BMI 58.11 kg/m²       Patient is status post General anesthesia for Procedure(s):  EXCISE HIDRADENITIS POSTERIOR NECK, APPLY ACELLULAR XENOGRAFT.    Nausea/Vomiting: None    Postoperative hydration reviewed and adequate.    Pain:  Managed    Neurological Status:   At baseline    Mental Status, Level of Consciousness: Alert and  oriented to person, place, and time    Pulmonary Status:   Adequate oxygenation and airway patent    Complications related to anesthesia: None    Post-anesthesia assessment completed. No concerns    Signed By: Jameson Ocasio MD     August 23, 2024

## 2024-08-23 NOTE — PROGRESS NOTES
Discharge instructions reviewed with patient and family using teachback. All questions have been answered. Prescriptions sent to [facility] pharmacy. Vital signs stable, pain appropriately managed. Patient wheeled off the unit with PACU staff.

## 2024-08-23 NOTE — DISCHARGE INSTRUCTIONS
Patient Discharge Instructions    Carin Chacon / [id number] : 1990    Admitted 2024 Discharged: 2024       It is important that you take the medication exactly as they are prescribed.   Keep your medication in the bottles provided by the pharmacist and keep a list of the medication names, dosages, and times to be taken in your wallet.   Do not take other medications without consulting your doctor.       What to do at Home    Recommended diet: Regular.    Recommended activity: No Restrictions.    No Driving While Taking Oxycodone.    Tylenol 1000 mg every 6 hours for two days and then 1000 mg every 6 hours as needed for pain.     Oxycodone as needed for severe pain.     Reinforce dressing as needed.     Wound care per visiting nurses.     If you experience any of the following symptoms Fevers, Chills, Nausea, Vomitting, Redness or Drainage at Surgical Site(s) or Any Other Questions or Concerns Please Call -  [phone].    Follow-up with Dr. Dyson on 2024 at 3:15 PM at [facility] office.      Anesthesia Discharge Instructions        ALL CLOTHING/VALUABLES RETURNED TO PATIENT BEFORE D/C HOME    PATIENT INSTRUCTIONS:    The first meal should be something that is light; not greasy or spicy.  If this is tolerated, then advance to regular diet as tolerated.      After general anesthesia or intravenous sedation, for 24 hours or while taking prescription Narcotics:  Limit your activities  Do not drive and operate hazardous machinery  Do not make important personal or business decisions  Do  not drink alcoholic beverages  If you have not urinated within 8 hours after discharge, please contact your surgeon on call.    Pain  Take pain medication as directed by your doctor   Call your doctor if pain is NOT relieved by medication  DO NOT take aspirin or blood thinners until directed by your doctor    Report the following to your surgeon:  Excessive pain, swelling, redness or

## 2024-08-23 NOTE — ANESTHESIA PRE PROCEDURE
08/09/2024    HCGQUANT 130 (H) 07/16/2016        ABGs: No results found for: \"PHART\", \"PO2ART\", \"DCH4TSC\", \"XPG3UBR\", \"BEART\", \"F9DEAGCF\"     Type & Screen (If Applicable):  No results found for: \"LABABO\"    Drug/Infectious Status (If Applicable):  Lab Results   Component Value Date/Time    HEPCAB NONREACTIVE 01/28/2022 10:55 AM       COVID-19 Screening (If Applicable): No results found for: \"COVID19\"        Anesthesia Evaluation  Patient summary reviewed and Nursing notes reviewed  Airway: Mallampati: II  TM distance: >3 FB   Neck ROM: full  Mouth opening: > = 3 FB   Dental: normal exam         Pulmonary:Negative Pulmonary ROS breath sounds clear to auscultation  (+)     sleep apnea: on CPAP,       asthma:                            Cardiovascular:Negative CV ROS  Exercise tolerance: good (>4 METS)  (+) hypertension:        Rhythm: regular  Rate: normal                    Neuro/Psych:   (+) headaches:, psychiatric history:            GI/Hepatic/Renal: Neg GI/Hepatic/Renal ROS  (+) morbid obesity          Endo/Other: Negative Endo/Other ROS   (+) DiabetesType II DM, blood dyscrasia::..                 Abdominal: normal exam            Vascular: negative vascular ROS.         Other Findings:           Anesthesia Plan      general     ASA 3       Induction: intravenous.    MIPS: Postoperative opioids intended and Prophylactic antiemetics administered.  Anesthetic plan and risks discussed with patient.    Use of blood products discussed with patient whom consented to blood products.    Plan discussed with CRNA.                  Jameson Ocasio MD   8/23/2024

## 2024-08-23 NOTE — BRIEF OP NOTE
Brief Postoperative Note      Patient: Carin Chacon  YOB: 1990  MRN: [id number]    Date of Procedure: 8/23/2024    Pre-Op Diagnosis: Hidradenitis Posterior Neck.    Post-Op Diagnosis:  Same.       Procedure:   Excise Hidradenitis Posterior Neck.    Apply Acellular Xenograft.     Surgeon(s):  Juan J Dyson MD    Assistant: Finesse Matt SA.    Anesthesia: General    Estimated Blood Loss (mL): Approximately 10 ml.    Complications: None    Specimens:   ID Type Source Tests Collected by Time Destination   1 : Hidradenitis Posterior Neck Tissue Neck SURGICAL PATHOLOGY Juan J Dyson MD 8/23/2024 1338        Implants:  Implant Name Type Inv. Item Serial No.  Lot No. LRB No. Used Action   DRESSING WND 6 LAYR 7X10 CM MTRX CYTAL - [id number]  DRESSING WND 6 LAYR 7X10 CM MTRX CYTAL [id number] [employer]WD [id number] N/A 1 Implanted   DRESSING WND MICRONIZED PARTIC 1000 MG MATRISTEM MICROMATRIX - [id number]  DRESSING WND MICRONIZED PARTIC 1000 MG MATRISTEM MICROMATRIX [id number] [employer]WD [id number] N/A 1 Implanted         Drains: * No LDAs found *    Findings:  Infection Present At Time Of Surgery (PATOS) (choose all levels that have infection present):  - Superficial Infection (skin/subcutaneous) present as evidenced by pus  Other Findings: Hidradenitis posterior neck - excised area approximately 2 cm x 11 cm.  This procedure was not performed to treat primary cutaneous melanoma through wide local excision    Electronically signed by Juan J Dyson MD on 8/23/2024 at 2:36 PM

## 2024-08-23 NOTE — OP NOTE
[address]  [address]                            OPERATIVE REPORT      PATIENT NAME: ASHISH GUZMÁN                : 1990  MED REC NO: [id number]                       ROOM: OR  ACCOUNT NO: [id number]                       ADMIT DATE: 2024  PROVIDER: Juan J Dyson MD    DATE OF SERVICE:  2024    PREOPERATIVE DIAGNOSES:  Hidradenitis, posterior neck.    POSTOPERATIVE DIAGNOSES:  Hidradenitis, posterior neck.    PROCEDURES PERFORMED:       1. Excise hidradenitis, posterior neck.     2. Apply acellular xenograft.    SURGEON:  Juan J Dyson MD    ASSISTANT:  Finesse Matt SA    ANESTHESIA:  General endotracheal.    ESTIMATED BLOOD LOSS:  Approximately 10 mL.    SPECIMENS REMOVED:  Hidradenitis of the posterior neck to pathology.    INTRAOPERATIVE FINDINGS:  Hidradenitis posterior neck - excised area approximately 11 cm x 2 cm.      COMPLICATIONS:  None.    IMPLANTS:  Acellular Xenograft.    INDICATIONS:  The patient is a 34-year-old female with hidradenitis of the posterior neck.  Ms. Guzmán is brought to the operating room at this time for excision of the hidradenitis and application of acellular xenograft.  The risks of the procedure, including but not limited to, infection, bleeding, and recurrent disease were discussed in detail with the patient.  Furthermore, the role of the acellular xenograft was discussed with Ms. Guzmán as well.  She understood and wished to proceed.    DESCRIPTION OF PROCEDURE:  After consent was obtained, the patient was brought to the operating room where she was intubated while in the supine position on the stretcher.  Following the induction of an adequate level of general anesthesia via the endotracheal tube, compression devices were placed on both lower extremities.  The patient was then carefully placed in the prone position on the operating room table.  After ensuring that all pressure points  MD Vinicio Dyson III, DO

## 2024-08-26 ENCOUNTER — TELEPHONE (OUTPATIENT)
Age: 34
End: 2024-08-26

## 2024-08-26 NOTE — TELEPHONE ENCOUNTER
Pt called in to check on the status of finding her a home health aide. Advised that at this time nurses are still looking for care for her. [facility] called in earlier to state they don't take pt's insurance however [facility] might. Pt wants to know will wound get infected as she waits for a home health aide. Advised nurse would return call as pt doesn't have post-op instructions

## 2024-08-26 NOTE — TELEPHONE ENCOUNTER
Bryson from [facility] called and stated they do not take the patients insurance. He recommended [facility] home care. He stated to feel free to contact him with any questions

## 2024-08-26 NOTE — TELEPHONE ENCOUNTER
Patient identified with two patient identifiers. Patient informed we are still working on HH coverage.  Most agencies are not accepting the plan she has.  I will contact her plan to see if they have a list that is covered.  Asked patient to come in tomorrow for wound check and dressing change. Patient states she is in school and will return tomorrow and will be there all day unable to make it in to office.  Patient asked if she has someone at home that could help with dressing change.  Patient states her daughter can help.  She confirmed she has wound gel given at discharge and a few more 4X4's and tape.  Discussed wound care with patient she confirmed xenograft is in place.  Patient advised not to pull or try to lift graft.  Made aware graft placed with dissolvable sutures.  Discussed wound care apply wound gel to wound cover with 4X4 and tape, change every other day as needed.  Patient informed I will send supply request to [facility] for home delivery.  Patient expressed understanding.

## 2024-08-26 NOTE — TELEPHONE ENCOUNTER
Patient called wondering about home health orders. She had surgery on Friday and is wondering what the plan is an who is providing her home care.

## 2024-08-27 ENCOUNTER — NURSE ONLY (OUTPATIENT)
Age: 34
End: 2024-08-27

## 2024-08-27 ENCOUNTER — TELEPHONE (OUTPATIENT)
Age: 34
End: 2024-08-27

## 2024-08-27 VITALS
HEART RATE: 89 BPM | TEMPERATURE: 99.4 F | DIASTOLIC BLOOD PRESSURE: 80 MMHG | WEIGHT: 293 LBS | HEIGHT: 66 IN | OXYGEN SATURATION: 97 % | RESPIRATION RATE: 17 BRPM | BODY MASS INDEX: 47.09 KG/M2 | SYSTOLIC BLOOD PRESSURE: 152 MMHG

## 2024-08-27 DIAGNOSIS — L73.2 HIDRADENITIS: Primary | ICD-10-CM

## 2024-08-27 ASSESSMENT — PATIENT HEALTH QUESTIONNAIRE - PHQ9
8. MOVING OR SPEAKING SO SLOWLY THAT OTHER PEOPLE COULD HAVE NOTICED. OR THE OPPOSITE, BEING SO FIGETY OR RESTLESS THAT YOU HAVE BEEN MOVING AROUND A LOT MORE THAN USUAL: NOT AT ALL
6. FEELING BAD ABOUT YOURSELF - OR THAT YOU ARE A FAILURE OR HAVE LET YOURSELF OR YOUR FAMILY DOWN: NOT AT ALL
1. LITTLE INTEREST OR PLEASURE IN DOING THINGS: NOT AT ALL
4. FEELING TIRED OR HAVING LITTLE ENERGY: NOT AT ALL
SUM OF ALL RESPONSES TO PHQ QUESTIONS 1-9: 0
SUM OF ALL RESPONSES TO PHQ QUESTIONS 1-9: 0
SUM OF ALL RESPONSES TO PHQ9 QUESTIONS 1 & 2: 0
SUM OF ALL RESPONSES TO PHQ QUESTIONS 1-9: 0
2. FEELING DOWN, DEPRESSED OR HOPELESS: NOT AT ALL
SUM OF ALL RESPONSES TO PHQ QUESTIONS 1-9: 0
9. THOUGHTS THAT YOU WOULD BE BETTER OFF DEAD, OR OF HURTING YOURSELF: NOT AT ALL
10. IF YOU CHECKED OFF ANY PROBLEMS, HOW DIFFICULT HAVE THESE PROBLEMS MADE IT FOR YOU TO DO YOUR WORK, TAKE CARE OF THINGS AT HOME, OR GET ALONG WITH OTHER PEOPLE: NOT DIFFICULT AT ALL
5. POOR APPETITE OR OVEREATING: NOT AT ALL
3. TROUBLE FALLING OR STAYING ASLEEP: NOT AT ALL
7. TROUBLE CONCENTRATING ON THINGS, SUCH AS READING THE NEWSPAPER OR WATCHING TELEVISION: NOT AT ALL

## 2024-08-27 NOTE — TELEPHONE ENCOUNTER
Pt called in stating that she was coming into the office for a dressing change. Advised pt wasn't scheduled for an appt today and that nurse would return call.

## 2024-08-27 NOTE — TELEPHONE ENCOUNTER
Chanel from [facility] called and stated they received the referral for the patient but they need a H&P form for the patient. Fax # [phone]

## 2024-08-27 NOTE — TELEPHONE ENCOUNTER
Patient called in regards to the home health supplies and stated they are on back order so the patient needs to  the supplies she needs.

## 2024-08-28 ENCOUNTER — TELEPHONE (OUTPATIENT)
Age: 34
End: 2024-08-28

## 2024-08-28 NOTE — TELEPHONE ENCOUNTER
Renata with [facility] health called and stated they have picked up the patient for home care. A nurse will be stopping by every three days to change the patients dressings. She also stated they will be faxing over a plan of care for approval.

## 2024-08-28 NOTE — TELEPHONE ENCOUNTER
Patient identified with two patient identifiers.  Patient confirmed she is here at hospital now and will come up to have dressing changed.  Patient informed I will have PSR add to schedule.

## 2024-08-28 NOTE — TELEPHONE ENCOUNTER
Daya from [facility] called to inform us pt will be seen by them tomorrow. Requested Brief Op Note and Op Report be faxed to [phone]. Advised I would fax over notes.

## 2024-08-30 ENCOUNTER — TELEPHONE (OUTPATIENT)
Age: 34
End: 2024-08-30

## 2024-08-30 NOTE — TELEPHONE ENCOUNTER
Returned call to patient informed pictures reviewed with provider.  Wound looks ok, yellowish drainage normal with acellular xenograft.  She should continue with dressing changes as directed.  Will keep appointment for Thursday as scheduled. Patient confirmed location of appt is at [facility].  Patient informed she can take xanax 30 min prior to appointment to help her relax to have remaining sutures removed to remove graft.  Patient expressed understanding will return call if needed.

## 2024-08-30 NOTE — TELEPHONE ENCOUNTER
Spoke with Renata Kristian  states graft I now lifted above wound bed.  States wound does not look as good as it did on Wednesday.  Noted yellowish drainage. No current C/O of odor or fever.  Patient has C/O increased pain.  She would like to see if provider can see patient earlier than next scheduled appt.  Informed I will discuss with surgeon and return call to patient directly.  She expressed understanding.          Patient identified with two patient identifiers. Patient informed of discussion with  nurse.  Patient asked if she would be able to come in office sooner than next scheduled appointment if needed?  Patient states she don't want to just come for us to prescribe antibiotics. Patient informed area may need assessing to determine if graft just needs to be removed fully.  It is common for wound with xenografts place to lift due to the sutures being dissolvable and it can have drainage from area.  Patient has no current C/O of fever or night sweats.  Per patient her BP was elevated.  She states she still has pain but it hurt more when the nurse applied a cleaning solution.  Patient asking if she will be put to sleep to have remaining sutures removed?  Patient informed this is done in the office and she will not be put to sleep.  Advised patient this does not require clipping or cutting her skin, the provider will clip the sutures to remove graft from wound.  Patient advised she can take her xanax if needed prior to the appointment to help her anxiety.  Patient requesting to send photo of wound for us to assess and decide.  Patient informed once I receive photo and review with provider I will return call.

## 2024-08-30 NOTE — TELEPHONE ENCOUNTER
Renata, home health nurse from [facility] called stating pt's wound has yellow drainage, suture has come a loose and no longer holding the graph on the wound site. Renata wants to know does pt need to be seen sooner.

## 2024-09-05 ENCOUNTER — TELEPHONE (OUTPATIENT)
Age: 34
End: 2024-09-05

## 2024-09-05 NOTE — TELEPHONE ENCOUNTER
Called pt to inform her 9/5/24 2:45 PO follow up needs to be moved to 4:30 PM. Mailbox full unable to leave vm. Re attempt will be made.

## 2024-09-05 NOTE — TELEPHONE ENCOUNTER
Attempted to reach patient regarding appointment for today that was rescheduled to 9/12.  No answer unable to leave message voicemail box full.

## 2024-09-11 ENCOUNTER — TELEPHONE (OUTPATIENT)
Age: 34
End: 2024-09-11

## 2024-09-12 ENCOUNTER — OFFICE VISIT (OUTPATIENT)
Age: 34
End: 2024-09-12

## 2024-09-12 VITALS
BODY MASS INDEX: 47.09 KG/M2 | SYSTOLIC BLOOD PRESSURE: 155 MMHG | WEIGHT: 293 LBS | DIASTOLIC BLOOD PRESSURE: 78 MMHG | HEIGHT: 66 IN | RESPIRATION RATE: 16 BRPM | OXYGEN SATURATION: 98 % | HEART RATE: 89 BPM | TEMPERATURE: 98.9 F

## 2024-09-12 DIAGNOSIS — L73.2 HIDRADENITIS: Primary | ICD-10-CM

## 2024-09-12 ASSESSMENT — PATIENT HEALTH QUESTIONNAIRE - PHQ9
4. FEELING TIRED OR HAVING LITTLE ENERGY: NOT AT ALL
SUM OF ALL RESPONSES TO PHQ QUESTIONS 1-9: 0
6. FEELING BAD ABOUT YOURSELF - OR THAT YOU ARE A FAILURE OR HAVE LET YOURSELF OR YOUR FAMILY DOWN: NOT AT ALL
9. THOUGHTS THAT YOU WOULD BE BETTER OFF DEAD, OR OF HURTING YOURSELF: NOT AT ALL
3. TROUBLE FALLING OR STAYING ASLEEP: NOT AT ALL
2. FEELING DOWN, DEPRESSED OR HOPELESS: NOT AT ALL
5. POOR APPETITE OR OVEREATING: NOT AT ALL
1. LITTLE INTEREST OR PLEASURE IN DOING THINGS: NOT AT ALL
7. TROUBLE CONCENTRATING ON THINGS, SUCH AS READING THE NEWSPAPER OR WATCHING TELEVISION: NOT AT ALL
10. IF YOU CHECKED OFF ANY PROBLEMS, HOW DIFFICULT HAVE THESE PROBLEMS MADE IT FOR YOU TO DO YOUR WORK, TAKE CARE OF THINGS AT HOME, OR GET ALONG WITH OTHER PEOPLE: NOT DIFFICULT AT ALL
SUM OF ALL RESPONSES TO PHQ QUESTIONS 1-9: 0
SUM OF ALL RESPONSES TO PHQ QUESTIONS 1-9: 0
8. MOVING OR SPEAKING SO SLOWLY THAT OTHER PEOPLE COULD HAVE NOTICED. OR THE OPPOSITE, BEING SO FIGETY OR RESTLESS THAT YOU HAVE BEEN MOVING AROUND A LOT MORE THAN USUAL: NOT AT ALL
SUM OF ALL RESPONSES TO PHQ9 QUESTIONS 1 & 2: 0
SUM OF ALL RESPONSES TO PHQ QUESTIONS 1-9: 0

## 2024-09-12 ASSESSMENT — ENCOUNTER SYMPTOMS
ROS SKIN COMMENTS: DENIES PAIN AT SURGICAL SITE.
VOMITING: 0
NAUSEA: 0

## 2024-09-16 ENCOUNTER — TELEPHONE (OUTPATIENT)
Age: 34
End: 2024-09-16

## 2024-09-23 ENCOUNTER — TELEPHONE (OUTPATIENT)
Age: 34
End: 2024-09-23

## 2024-09-23 DIAGNOSIS — L73.2 HIDRADENITIS: Primary | ICD-10-CM

## 2024-09-24 ENCOUNTER — TELEPHONE (OUTPATIENT)
Age: 34
End: 2024-09-24

## 2024-09-25 ENCOUNTER — TELEPHONE (OUTPATIENT)
Age: 34
End: 2024-09-25

## 2024-09-27 ENCOUNTER — TELEPHONE (OUTPATIENT)
Age: 34
End: 2024-09-27

## 2024-09-27 DIAGNOSIS — L73.2 HIDRADENITIS: Primary | ICD-10-CM

## 2024-09-30 ENCOUNTER — TELEPHONE (OUTPATIENT)
Age: 34
End: 2024-09-30

## 2024-09-30 ENCOUNTER — OFFICE VISIT (OUTPATIENT)
Age: 34
End: 2024-09-30
Payer: MEDICAID

## 2024-09-30 VITALS
OXYGEN SATURATION: 96 % | RESPIRATION RATE: 17 BRPM | WEIGHT: 293 LBS | DIASTOLIC BLOOD PRESSURE: 82 MMHG | TEMPERATURE: 98.4 F | SYSTOLIC BLOOD PRESSURE: 119 MMHG | HEIGHT: 66 IN | HEART RATE: 98 BPM | BODY MASS INDEX: 47.09 KG/M2

## 2024-09-30 DIAGNOSIS — K21.9 GASTROESOPHAGEAL REFLUX DISEASE WITHOUT ESOPHAGITIS: ICD-10-CM

## 2024-09-30 DIAGNOSIS — R06.83 SNORES: ICD-10-CM

## 2024-09-30 DIAGNOSIS — E66.01 OBESITY, MORBID, BMI 50 OR HIGHER: Primary | ICD-10-CM

## 2024-09-30 DIAGNOSIS — G47.33 OSA (OBSTRUCTIVE SLEEP APNEA): ICD-10-CM

## 2024-09-30 DIAGNOSIS — Z72.0 TOBACCO ABUSE: ICD-10-CM

## 2024-09-30 DIAGNOSIS — J45.41 MODERATE PERSISTENT ASTHMA WITH ACUTE EXACERBATION: ICD-10-CM

## 2024-09-30 DIAGNOSIS — K43.9 VENTRAL HERNIA WITHOUT OBSTRUCTION OR GANGRENE: ICD-10-CM

## 2024-09-30 PROCEDURE — 99214 OFFICE O/P EST MOD 30 MIN: CPT | Performed by: SURGERY

## 2024-09-30 ASSESSMENT — PATIENT HEALTH QUESTIONNAIRE - PHQ9
SUM OF ALL RESPONSES TO PHQ9 QUESTIONS 1 & 2: 1
1. LITTLE INTEREST OR PLEASURE IN DOING THINGS: NOT AT ALL
SUM OF ALL RESPONSES TO PHQ QUESTIONS 1-9: 1
2. FEELING DOWN, DEPRESSED OR HOPELESS: SEVERAL DAYS

## 2024-09-30 NOTE — TELEPHONE ENCOUNTER
Sent completed  Psychological Evaluation Assessment to provider for review; scheduled dietitian appointment; sent [facility] orientation to patient via email

## 2024-09-30 NOTE — PROGRESS NOTES
Identified patient with two patient identifiers (name and ). Reviewed chart in preparation for visit and have obtained necessary documentation.    Carin Chacon is a 34 y.o. female  Chief Complaint   Patient presents with    New Patient     New Bariatrics      /82 (Site: Right Upper Arm, Position: Sitting, Cuff Size: Thigh)   Pulse 98   Temp 98.4 °F (36.9 °C) (Oral)   Resp 17   Ht 1.676 m (5' 6\")   Wt (!) 169.4 kg (373 lb 6.4 oz)   SpO2 96%   BMI 60.27 kg/m²     1. Have you been to the ER, urgent care clinic since your last visit?  Hospitalized since your last visit?no    2. Have you seen or consulted any other health care providers outside of the [facility] since your last visit?  Include any pap smears or colon screening. no  
regain, and pouch dilation.  Specific weight related outcomes for success were also discussed with an emphasis on careful and close follow-up with the first year.  The patient expressed an understanding of the above factors, and her questions were answered in their entirety.    In addition, the patient attended a 1.5 hour power point seminar regarding obesity, surgical weight loss including, adjustable gastric band, gastric bypass, and sleeve gastrectomy.  This discussion contrasted the different surgical techniques, mechanisms of actions and expected outcomes, and surgical and medical risks associated with each procedure.  During this seminar, there was a long question and answer session where each questions was answered until there were no additional questions.     Today, the patient had all of her questions answered and desires to proceed with pre-qualification for bariatric surgery initially choosing charly-en-Y gastric bypass as her surgical option.  Intake labs ordered.  She will schedule psychology evaluation and initiate supervised medical weight loss visits with our dietitian.  Upper GI series ordered to assess for hiatal hernia given reflux symptoms.  Sleep medicine evaluation ordered given high BMI, snoring, to assess for LANDY.  We discussed her preoperative weight loss goals and she understands she will need to lose at least 18 pounds in order to proceed with intended surgery.  She is interested in antiobesity medications via our nurse practitioner if she is eligible to assist with preoperative weight loss.  She understands that she will need to be both tobacco free and THC free for 90 days in order to proceed with intended surgery.      Signed By: Gera Melgar MD     September 30, 2024

## 2024-10-01 LAB
25(OH)D3+25(OH)D2 SERPL-MCNC: 8.8 NG/ML (ref 30–100)
ALBUMIN SERPL-MCNC: 4.2 G/DL (ref 3.9–4.9)
ALP SERPL-CCNC: 107 IU/L (ref 44–121)
ALT SERPL-CCNC: 21 IU/L (ref 0–32)
AST SERPL-CCNC: 16 IU/L (ref 0–40)
BASOPHILS # BLD AUTO: 0 X10E3/UL (ref 0–0.2)
BASOPHILS NFR BLD AUTO: 0 %
BILIRUB SERPL-MCNC: 0.3 MG/DL (ref 0–1.2)
BUN SERPL-MCNC: 9 MG/DL (ref 6–20)
BUN/CREAT SERPL: 11 (ref 9–23)
CALCIUM SERPL-MCNC: 8.6 MG/DL (ref 8.7–10.2)
CHLORIDE SERPL-SCNC: 99 MMOL/L (ref 96–106)
CO2 SERPL-SCNC: 22 MMOL/L (ref 20–29)
CREAT SERPL-MCNC: 0.84 MG/DL (ref 0.57–1)
EGFRCR SERPLBLD CKD-EPI 2021: 93 ML/MIN/1.73
EOSINOPHIL # BLD AUTO: 0.2 X10E3/UL (ref 0–0.4)
EOSINOPHIL NFR BLD AUTO: 2 %
ERYTHROCYTE [DISTWIDTH] IN BLOOD BY AUTOMATED COUNT: 16.5 % (ref 11.7–15.4)
FOLATE SERPL-MCNC: 5 NG/ML
GLOBULIN SER CALC-MCNC: 3.1 G/DL (ref 1.5–4.5)
GLUCOSE SERPL-MCNC: 153 MG/DL (ref 70–99)
HBA1C MFR BLD: 9.1 % (ref 4.8–5.6)
HCT VFR BLD AUTO: 36.6 % (ref 34–46.6)
HGB BLD-MCNC: 11.1 G/DL (ref 11.1–15.9)
IMM GRANULOCYTES # BLD AUTO: 0 X10E3/UL (ref 0–0.1)
IMM GRANULOCYTES NFR BLD AUTO: 0 %
IRON SATN MFR SERPL: 14 % (ref 15–55)
IRON SERPL-MCNC: 43 UG/DL (ref 27–159)
LYMPHOCYTES # BLD AUTO: 2.8 X10E3/UL (ref 0.7–3.1)
LYMPHOCYTES NFR BLD AUTO: 33 %
MCH RBC QN AUTO: 21.8 PG (ref 26.6–33)
MCHC RBC AUTO-ENTMCNC: 30.3 G/DL (ref 31.5–35.7)
MCV RBC AUTO: 72 FL (ref 79–97)
MONOCYTES # BLD AUTO: 0.4 X10E3/UL (ref 0.1–0.9)
MONOCYTES NFR BLD AUTO: 5 %
NEUTROPHILS # BLD AUTO: 4.9 X10E3/UL (ref 1.4–7)
NEUTROPHILS NFR BLD AUTO: 60 %
PLATELET # BLD AUTO: 376 X10E3/UL (ref 150–450)
POTASSIUM SERPL-SCNC: 3.7 MMOL/L (ref 3.5–5.2)
PROT SERPL-MCNC: 7.3 G/DL (ref 6–8.5)
RBC # BLD AUTO: 5.1 X10E6/UL (ref 3.77–5.28)
SODIUM SERPL-SCNC: 139 MMOL/L (ref 134–144)
TIBC SERPL-MCNC: 307 UG/DL (ref 250–450)
TSH SERPL DL<=0.005 MIU/L-ACNC: 0.86 UIU/ML (ref 0.45–4.5)
UIBC SERPL-MCNC: 264 UG/DL (ref 131–425)
VIT B12 SERPL-MCNC: 211 PG/ML (ref 232–1245)
WBC # BLD AUTO: 8.4 X10E3/UL (ref 3.4–10.8)

## 2024-10-02 DIAGNOSIS — L73.2 HIDRADENITIS SUPPURATIVA OF MULTIPLE SITES: Primary | ICD-10-CM

## 2024-10-02 LAB — UREA BREATH TEST QL: NEGATIVE

## 2024-10-02 RX ORDER — OXYCODONE HYDROCHLORIDE 5 MG/1
5 TABLET ORAL EVERY 6 HOURS PRN
Qty: 12 TABLET | Refills: 0 | Status: SHIPPED | OUTPATIENT
Start: 2024-10-02 | End: 2024-10-05

## 2024-10-02 NOTE — TELEPHONE ENCOUNTER
Patient informed prescription sent.  Advised to move follow up appointment if no relief or symptoms become worse.

## 2024-10-02 NOTE — TELEPHONE ENCOUNTER
Patient identified with two patient identifiers. Patient informed no previous messages received for medication refill request.  Patient states she tried sending in [place] but couldn't.  Patient states she has pain on right side of wound.  It a little puffy in area. Some increased drainage.  No fever, or purulent drainage.  States HH is still coming out most recent visit today.  Patient concerned right side of incision my have a reoccurring boil? Due to pain and puffiness.  Patient taking tylenol, BC powder and other over the counter pain medication with no relief.  Patient confirmed pharmacy on file advised I would send request to provider to review and follow up.

## 2024-10-02 NOTE — TELEPHONE ENCOUNTER
Patient stated she never got a refill for her pain medication. Patient stated it is for the pain at night.

## 2024-10-04 ENCOUNTER — TELEPHONE (OUTPATIENT)
Age: 34
End: 2024-10-04

## 2024-10-04 NOTE — TELEPHONE ENCOUNTER
Pt states with her A1C does she have to take insulin?    Pt ask with this does she have to check blood everyday?    Pt has an appt this afternoon at 2 pm/should she cover this then?    Pt would like a call back now though.  Asap she states.

## 2024-10-04 NOTE — TELEPHONE ENCOUNTER
Pt stated that A1c is  9.1- Biriatric surgeon took this on 9/30/24     Has been seeing [facility]-had no concern- just goes to sleep.   Pt stated that most recent blood pressure readings 146/86  and 172/100.    **pt inquiry for A1c level- is going straight to Insulin the only option.?   Stated that \"Metformin\" makes her go to bathroom.

## 2024-10-07 ENCOUNTER — OFFICE VISIT (OUTPATIENT)
Age: 34
End: 2024-10-07

## 2024-10-07 ENCOUNTER — TELEPHONE (OUTPATIENT)
Age: 34
End: 2024-10-07

## 2024-10-07 DIAGNOSIS — E66.01 MORBID OBESITY: Primary | ICD-10-CM

## 2024-10-07 NOTE — TELEPHONE ENCOUNTER
Spoke with patient, patient asked to have SWL Letter emailed and sent through [employer].

## 2024-10-07 NOTE — PROGRESS NOTES
Pre-operative Bariatric Nutrition Evaluation ([facility] )     Date: 10/7/2024   Physician/Surgeon:Gera Melgar M.D.   Name: Carin Chacon  :  1990  Age:  34  Gender: Female   Type of Surgery: [x]           Gastric Bypass   []           Sleeve Gastrectomy    ASSESSMENT:    Past Medical History:elevated blood pressure and recent elevated HgbA1c     Medications/Supplements:   Prior to Admission medications    Medication Sig Start Date End Date Taking? Authorizing Provider   acetaminophen (TYLENOL) 500 MG tablet Take 2 tablets by mouth every 6 hours as needed for Pain 24   Juan J Dyson MD   ALPRAZolam (XANAX) 1 MG tablet Take 1 tablet by mouth in the morning and 1 tablet in the evening. 7/15/24   Provider, MD Kiarra       Food Allergies/Intolerances:none    Anthropometrics:    Ht:66\"   Initial Office Wt: 373#    IBW: 130#    %IBW: 286%     BMI:60    Category: obesity III     Reported wt history: Pt presents today for pre-op nutrition evaluation for wt loss surgery. Reports highest adult BW of 373#. Pt went through our program in  and completed 4 out of 6 nutrition visits at which time her wt was 363#. Attributes wt gain over the years r/t COVID that influenced poor eating habits (boredom eating) and going through a divorce that resulted in ordering [employer] daily. Has attempted wt loss through various methods with most successful wt loss of 10# . Has been unable to maintain long term or significant wt loss and is now seeking approval for weight loss surgery. Pt will need to complete 6 months of supervised weight loss for insurance requirements with 18# wt loss recommended during that time. Recent HgbA1c was 9% and is scheduled to see PCP today. Pt has not been diagnosed with diabetes in the past.     Exercise/Physical Activity:none     Reported Diet History:self-directed diets, supervised weight loss with our program ()     24 Hour Diet Recall  Breakfast  Skips    Lunch  Skips    Dinner

## 2024-10-08 ENCOUNTER — TELEPHONE (OUTPATIENT)
Age: 34
End: 2024-10-08

## 2024-10-08 NOTE — TELEPHONE ENCOUNTER
Please contact patient to schedule Psychological Evaluation. Patient can be reached at [phone]

## 2024-10-10 ENCOUNTER — OFFICE VISIT (OUTPATIENT)
Age: 34
End: 2024-10-10
Payer: MEDICAID

## 2024-10-10 VITALS
TEMPERATURE: 98.2 F | HEART RATE: 94 BPM | RESPIRATION RATE: 14 BRPM | OXYGEN SATURATION: 95 % | WEIGHT: 293 LBS | BODY MASS INDEX: 47.09 KG/M2 | HEIGHT: 66 IN | SYSTOLIC BLOOD PRESSURE: 150 MMHG | DIASTOLIC BLOOD PRESSURE: 91 MMHG

## 2024-10-10 DIAGNOSIS — E11.69 HYPERLIPIDEMIA ASSOCIATED WITH TYPE 2 DIABETES MELLITUS (HCC): ICD-10-CM

## 2024-10-10 DIAGNOSIS — E78.5 HYPERLIPIDEMIA ASSOCIATED WITH TYPE 2 DIABETES MELLITUS (HCC): ICD-10-CM

## 2024-10-10 DIAGNOSIS — E28.2 PCOS (POLYCYSTIC OVARIAN SYNDROME): ICD-10-CM

## 2024-10-10 DIAGNOSIS — E11.65 UNCONTROLLED TYPE 2 DIABETES MELLITUS WITH HYPERGLYCEMIA (HCC): Primary | ICD-10-CM

## 2024-10-10 DIAGNOSIS — E66.01 MORBID OBESITY WITH BMI OF 60.0-69.9, ADULT: ICD-10-CM

## 2024-10-10 DIAGNOSIS — F31.70 BIPOLAR AFFECTIVE DISORDER IN REMISSION (HCC): ICD-10-CM

## 2024-10-10 DIAGNOSIS — I10 ESSENTIAL HYPERTENSION: ICD-10-CM

## 2024-10-10 LAB
CHOLEST SERPL-MCNC: 195 MG/DL
HDLC SERPL-MCNC: 37 MG/DL
HDLC SERPL: 5.3 (ref 0–5)
LDLC SERPL CALC-MCNC: 127.6 MG/DL (ref 0–100)
TRIGL SERPL-MCNC: 152 MG/DL
VLDLC SERPL CALC-MCNC: 30.4 MG/DL

## 2024-10-10 PROCEDURE — 3046F HEMOGLOBIN A1C LEVEL >9.0%: CPT | Performed by: INTERNAL MEDICINE

## 2024-10-10 PROCEDURE — 99214 OFFICE O/P EST MOD 30 MIN: CPT | Performed by: INTERNAL MEDICINE

## 2024-10-10 PROCEDURE — 3080F DIAST BP >= 90 MM HG: CPT | Performed by: INTERNAL MEDICINE

## 2024-10-10 PROCEDURE — 3077F SYST BP >= 140 MM HG: CPT | Performed by: INTERNAL MEDICINE

## 2024-10-10 RX ORDER — GLIPIZIDE 5 MG/1
5 TABLET ORAL 2 TIMES DAILY WITH MEALS
Qty: 60 TABLET | Refills: 5 | Status: SHIPPED | OUTPATIENT
Start: 2024-10-10

## 2024-10-10 RX ORDER — LOSARTAN POTASSIUM 25 MG/1
25 TABLET ORAL DAILY
Qty: 30 TABLET | Refills: 5 | Status: SHIPPED | OUTPATIENT
Start: 2024-10-10

## 2024-10-10 SDOH — ECONOMIC STABILITY: INCOME INSECURITY: HOW HARD IS IT FOR YOU TO PAY FOR THE VERY BASICS LIKE FOOD, HOUSING, MEDICAL CARE, AND HEATING?: NOT HARD AT ALL

## 2024-10-10 SDOH — ECONOMIC STABILITY: FOOD INSECURITY: WITHIN THE PAST 12 MONTHS, THE FOOD YOU BOUGHT JUST DIDN'T LAST AND YOU DIDN'T HAVE MONEY TO GET MORE.: NEVER TRUE

## 2024-10-10 SDOH — ECONOMIC STABILITY: FOOD INSECURITY: WITHIN THE PAST 12 MONTHS, YOU WORRIED THAT YOUR FOOD WOULD RUN OUT BEFORE YOU GOT MONEY TO BUY MORE.: NEVER TRUE

## 2024-10-10 NOTE — PROGRESS NOTES
Carin Chacon is a 34 y.o. female who presents for evaluation of new dx diabetes.  Last seen by me may 27, 2022.  She no showed for appts on oct 4 and oct 7.  Had labs done recently with bariatric sx team, and A1c 9.1.  she had taken metformin in past for pcos and was prediabetic.  Metformin was very difficult on her gi system, even XR model.  She recently had sx on her neck as well for recurrent hidradenitis supportiva.   Also complains of some low back pain, which is making it tougher for her to be active.        ROS:  Constitutional: negative for fevers, chills, anorexia and weight loss  Eyes:   negative for visual disturbance and irritation  ENT:   negative for tinnitus,sore throat,nasal congestion,ear pain,hoarseness  Respiratory:  negative for cough, hemoptysis, dyspnea,wheezing  CV:   negative for chest pain, palpitations, lower extremity edema  GI:   negative for nausea, vomiting, diarrhea, abdominal pain,melena  Genitourinary: negative for frequency, dysuria and hematuria  Musculoskel: negative for myalgias, arthralgias, back pain, muscle weakness, joint pain  Neurological:  negative for headaches, dizziness, focal weakness, numbness  Psychiatric:     Negative for depression or anxiety      Past Medical History:   Diagnosis Date    ADD (attention deficit disorder) 1/9/2014    Aggressive outburst     Anxiety disorder     Arthritis     right ankle     Asthma     Bipolar depression ([facility])     Bipolar disorder ([facility]) 9/17/2013    Chronic pain     back     Contact dermatitis and other eczema, due to unspecified cause     boil    Depression     Diabetes (HCC)     borderline/ no meds    Diabetes mellitus (HCC)     Ectopic pregnancy, tubal     Essential hypertension     ON ONE OCCASION NO MEDS    Headache(784.0)     migraines    Hidradenitis     NECK    Hidradenitis suppurativa of multiple sites 11/05/2015    Ill-defined condition     anxiety disorder    Infertility, female     Cheyanne (HCC)     Mood disorder ([facility])

## 2024-10-10 NOTE — PROGRESS NOTES
\"Have you been to the ER, urgent care clinic since your last visit?  Hospitalized since your last visit?\"    NO    “Have you seen or consulted any other health care providers outside our system since your last visit?”    NO     “Have you had a pap smear?”    NO    Date of last Cervical Cancer screen (HPV or PAP): 9/9/2021

## 2024-10-11 ENCOUNTER — TELEPHONE (OUTPATIENT)
Age: 34
End: 2024-10-11

## 2024-10-11 RX ORDER — ATORVASTATIN CALCIUM 20 MG/1
20 TABLET, FILM COATED ORAL DAILY
Qty: 90 TABLET | Refills: 3 | Status: SHIPPED | OUTPATIENT
Start: 2024-10-11

## 2024-11-04 ENCOUNTER — OFFICE VISIT (OUTPATIENT)
Age: 34
End: 2024-11-04

## 2024-11-04 ENCOUNTER — TELEPHONE (OUTPATIENT)
Age: 34
End: 2024-11-04

## 2024-11-04 DIAGNOSIS — E66.01 MORBID OBESITY: Primary | ICD-10-CM

## 2024-11-04 NOTE — TELEPHONE ENCOUNTER
Maame from pts home health called and stated pt wants to be discharged from wound care because she wont let anyone in the house. They are disconnecting services as of last Friday. Maame [phone]

## 2024-11-04 NOTE — TELEPHONE ENCOUNTER
Returned to Maame with HH informed message received.  She states she has offered to come along with the nurse to the patient and patient still refuse visits will not allow them to enter the home.  Informed Maame I will update provider.  Will have PSR reach out to schedule appt to assess wound.

## 2024-11-04 NOTE — PROGRESS NOTES
[facility]  Supervised Weight Loss     Date:   2024    Patient's Name: Carin Chacon  : 1990    Carin Chacon was evaluated through a synchronous (real-time) audio encounter. Patient identification was verified at the start of the visit.    The patient was located at Home: [address] [address].  The provider was located at Facility (Appt Dept): [address] [place] [address].  Confirm you are appropriately licensed, registered, or certified to deliver care in the state where the patient is located as indicated above. If you are not or unsure, please re-schedule the visit: Yes, I confirm.     Insurance:  [facility]            Session:   Surgery: Gastric Bypass  Surgeon:  Gera Melgar M.D.     Height: 66\"   Recent EMR Weight:    374      Lbs.   BMI: 60   Pounds Lost since last month: 0               Pounds Gained since last month: 1#    Starting Weight: 373#   Previous Month’s Weight: 373#  Overall Pounds Lost: 0  Overall Pounds Gained: 1#    Other Pertinent Information: Today's appointment was completed over the phone. Today's wt  was pulled from most recent wt in EMR (10/10/24).      Smoking Status:  none  Alcohol Intake: none    I have reviewed with pt the guidelines of the supervised wt loss program.  Pt understands the expectations of some wt loss during the program and that wt gain could delay the process. I have also explained that appointments need to be consecutive and missing an appointment may result in starting over. Pt has received this information in writing. This appointment was complimentary/non-billable as part of the bariatric surgery program.         Changes that patient has made since last month include:  switched to Gatorade Zero, keto-candy, using healthy frozen meals      Eating Habits and Behaviors  A nutrition lesson was presented on label reading with specific guidelines provided for

## 2024-11-18 ENCOUNTER — TELEPHONE (OUTPATIENT)
Age: 34
End: 2024-11-18

## 2024-11-18 NOTE — TELEPHONE ENCOUNTER
Patient called in reference to missed appt this morning at 9 am. States she never received a phone call. Staff made an attempt to call her but it went straight to a full VM box.     Patient requested call back to reschedule    Called from this number: [phone]

## 2024-11-18 NOTE — TELEPHONE ENCOUNTER
Contacted patient regarding appointment on 11/18/24 with Kelly; no answer; unable to leave voicemail

## 2024-12-03 ENCOUNTER — TELEPHONE (OUTPATIENT)
Age: 34
End: 2024-12-03

## 2024-12-03 ENCOUNTER — OFFICE VISIT (OUTPATIENT)
Age: 34
End: 2024-12-03

## 2024-12-03 DIAGNOSIS — E66.01 MORBID OBESITY: Primary | ICD-10-CM

## 2024-12-03 NOTE — PROGRESS NOTES
mindfully. Tips and recommendations for how to make these changes were provided. Pt was encouraged to keep a food journal and record what they were taking in daily.         Overall Assessment: Pt demonstrates some small/appropriate lifestyle changes. Continued changes indicated. Goals set and recommendations made. Will continue to assess.     Patient-Set Goals:   1. Nutrition - protein at each meal, consider switching to Boost Max protein to better meet protein needs after surgery  2. Exercise - none  3. Behavior -protein first at meals, review list of protein foods emailed/provided      Antonina Mendiola RD  12/3/2024

## 2024-12-31 ENCOUNTER — APPOINTMENT (OUTPATIENT)
Facility: HOSPITAL | Age: 34
End: 2024-12-31
Payer: MEDICAID

## 2024-12-31 ENCOUNTER — HOSPITAL ENCOUNTER (EMERGENCY)
Facility: HOSPITAL | Age: 34
Discharge: HOME | End: 2025-01-01
Payer: MEDICAID

## 2024-12-31 VITALS
SYSTOLIC BLOOD PRESSURE: 170 MMHG | RESPIRATION RATE: 20 BRPM | WEIGHT: 293 LBS | OXYGEN SATURATION: 99 % | BODY MASS INDEX: 47.09 KG/M2 | HEIGHT: 66 IN | TEMPERATURE: 98.2 F | DIASTOLIC BLOOD PRESSURE: 97 MMHG | HEART RATE: 94 BPM

## 2024-12-31 DIAGNOSIS — R03.0 ELEVATED BLOOD PRESSURE READING: ICD-10-CM

## 2024-12-31 DIAGNOSIS — L98.499 SKIN ULCER, UNSPECIFIED ULCER STAGE (HCC): ICD-10-CM

## 2024-12-31 DIAGNOSIS — N61.0 CELLULITIS OF BREAST: Primary | ICD-10-CM

## 2024-12-31 DIAGNOSIS — R73.9 HYPERGLYCEMIA: ICD-10-CM

## 2024-12-31 DIAGNOSIS — R79.89 ELEVATED SERUM CREATININE: ICD-10-CM

## 2024-12-31 DIAGNOSIS — R93.89 ABNORMAL CT SCAN: ICD-10-CM

## 2024-12-31 LAB
ALBUMIN SERPL-MCNC: 2.7 G/DL (ref 3.5–5)
ALBUMIN/GLOB SERPL: 0.6 (ref 1.1–2.2)
ALP SERPL-CCNC: 96 U/L (ref 45–117)
ALT SERPL-CCNC: 20 U/L (ref 12–78)
ANION GAP SERPL CALC-SCNC: 10 MMOL/L (ref 2–12)
AST SERPL-CCNC: 13 U/L (ref 15–37)
BASOPHILS # BLD: 0 K/UL (ref 0–0.1)
BASOPHILS NFR BLD: 0 % (ref 0–1)
BILIRUB SERPL-MCNC: 0.2 MG/DL (ref 0.2–1)
BUN SERPL-MCNC: 11 MG/DL (ref 6–20)
BUN/CREAT SERPL: 10 (ref 12–20)
CALCIUM SERPL-MCNC: 8.8 MG/DL (ref 8.5–10.1)
CHLORIDE SERPL-SCNC: 95 MMOL/L (ref 97–108)
CO2 SERPL-SCNC: 26 MMOL/L (ref 21–32)
CREAT SERPL-MCNC: 1.12 MG/DL (ref 0.55–1.02)
DIFFERENTIAL METHOD BLD: ABNORMAL
EOSINOPHIL # BLD: 0.2 K/UL (ref 0–0.4)
EOSINOPHIL NFR BLD: 2 % (ref 0–7)
ERYTHROCYTE [DISTWIDTH] IN BLOOD BY AUTOMATED COUNT: 17.4 % (ref 11.5–14.5)
GLOBULIN SER CALC-MCNC: 4.8 G/DL (ref 2–4)
GLUCOSE BLD STRIP.AUTO-MCNC: 410 MG/DL (ref 65–117)
GLUCOSE SERPL-MCNC: 483 MG/DL (ref 65–100)
HCG UR QL: NEGATIVE
HCT VFR BLD AUTO: 33.8 % (ref 35–47)
HGB BLD-MCNC: 10.7 G/DL (ref 11.5–16)
IMM GRANULOCYTES # BLD AUTO: 0 K/UL (ref 0–0.04)
IMM GRANULOCYTES NFR BLD AUTO: 0 % (ref 0–0.5)
LYMPHOCYTES # BLD: 2.4 K/UL (ref 0.8–3.5)
LYMPHOCYTES NFR BLD: 31 % (ref 12–49)
MCH RBC QN AUTO: 21.8 PG (ref 26–34)
MCHC RBC AUTO-ENTMCNC: 31.7 G/DL (ref 30–36.5)
MCV RBC AUTO: 68.8 FL (ref 80–99)
MONOCYTES # BLD: 0.5 K/UL (ref 0–1)
MONOCYTES NFR BLD: 6 % (ref 5–13)
NEUTS SEG # BLD: 4.7 K/UL (ref 1.8–8)
NEUTS SEG NFR BLD: 61 % (ref 32–75)
NRBC # BLD: 0 K/UL (ref 0–0.01)
NRBC BLD-RTO: 0 PER 100 WBC
PLATELET # BLD AUTO: 361 K/UL (ref 150–400)
PMV BLD AUTO: 9.1 FL (ref 8.9–12.9)
POTASSIUM SERPL-SCNC: 3.7 MMOL/L (ref 3.5–5.1)
PROT SERPL-MCNC: 7.5 G/DL (ref 6.4–8.2)
RBC # BLD AUTO: 4.91 M/UL (ref 3.8–5.2)
RBC MORPH BLD: ABNORMAL
SERVICE CMNT-IMP: ABNORMAL
SODIUM SERPL-SCNC: 131 MMOL/L (ref 136–145)
WBC # BLD AUTO: 7.8 K/UL (ref 3.6–11)

## 2024-12-31 PROCEDURE — 87040 BLOOD CULTURE FOR BACTERIA: CPT

## 2024-12-31 PROCEDURE — 6370000000 HC RX 637 (ALT 250 FOR IP): Performed by: PHYSICIAN ASSISTANT

## 2024-12-31 PROCEDURE — 82962 GLUCOSE BLOOD TEST: CPT

## 2024-12-31 PROCEDURE — 87154 CUL TYP ID BLD PTHGN 6+ TRGT: CPT

## 2024-12-31 PROCEDURE — 81025 URINE PREGNANCY TEST: CPT

## 2024-12-31 PROCEDURE — 96375 TX/PRO/DX INJ NEW DRUG ADDON: CPT

## 2024-12-31 PROCEDURE — 96368 THER/DIAG CONCURRENT INF: CPT

## 2024-12-31 PROCEDURE — 6360000004 HC RX CONTRAST MEDICATION: Performed by: PHYSICIAN ASSISTANT

## 2024-12-31 PROCEDURE — 80053 COMPREHEN METABOLIC PANEL: CPT

## 2024-12-31 PROCEDURE — 2580000003 HC RX 258: Performed by: PHYSICIAN ASSISTANT

## 2024-12-31 PROCEDURE — 6360000002 HC RX W HCPCS: Performed by: PHYSICIAN ASSISTANT

## 2024-12-31 PROCEDURE — 96365 THER/PROPH/DIAG IV INF INIT: CPT

## 2024-12-31 PROCEDURE — 99285 EMERGENCY DEPT VISIT HI MDM: CPT

## 2024-12-31 PROCEDURE — 85025 COMPLETE CBC W/AUTO DIFF WBC: CPT

## 2024-12-31 PROCEDURE — 96366 THER/PROPH/DIAG IV INF ADDON: CPT

## 2024-12-31 PROCEDURE — 71260 CT THORAX DX C+: CPT

## 2024-12-31 RX ORDER — HYDROCODONE BITARTRATE AND ACETAMINOPHEN 5; 325 MG/1; MG/1
1 TABLET ORAL EVERY 6 HOURS PRN
Qty: 4 TABLET | Refills: 0 | Status: SHIPPED | OUTPATIENT
Start: 2024-12-31 | End: 2025-01-03

## 2024-12-31 RX ORDER — 0.9 % SODIUM CHLORIDE 0.9 %
500 INTRAVENOUS SOLUTION INTRAVENOUS ONCE
Status: COMPLETED | OUTPATIENT
Start: 2024-12-31 | End: 2025-01-01

## 2024-12-31 RX ORDER — SULFAMETHOXAZOLE AND TRIMETHOPRIM 800; 160 MG/1; MG/1
1 TABLET ORAL 2 TIMES DAILY
Qty: 14 TABLET | Refills: 0 | Status: SHIPPED | OUTPATIENT
Start: 2024-12-31 | End: 2025-01-07

## 2024-12-31 RX ORDER — HYDROCODONE BITARTRATE AND ACETAMINOPHEN 5; 325 MG/1; MG/1
1 TABLET ORAL
Status: COMPLETED | OUTPATIENT
Start: 2024-12-31 | End: 2024-12-31

## 2024-12-31 RX ORDER — LORAZEPAM 2 MG/ML
0.5 INJECTION INTRAMUSCULAR ONCE
Status: COMPLETED | OUTPATIENT
Start: 2024-12-31 | End: 2024-12-31

## 2024-12-31 RX ORDER — ACETAMINOPHEN 325 MG/1
650 TABLET ORAL EVERY 6 HOURS PRN
Qty: 20 TABLET | Refills: 0 | Status: SHIPPED | OUTPATIENT
Start: 2024-12-31

## 2024-12-31 RX ORDER — CEPHALEXIN 500 MG/1
500 CAPSULE ORAL 4 TIMES DAILY
Qty: 28 CAPSULE | Refills: 0 | Status: SHIPPED | OUTPATIENT
Start: 2024-12-31 | End: 2025-01-07

## 2024-12-31 RX ORDER — IOPAMIDOL 755 MG/ML
100 INJECTION, SOLUTION INTRAVASCULAR
Status: COMPLETED | OUTPATIENT
Start: 2024-12-31 | End: 2024-12-31

## 2024-12-31 RX ADMIN — LORAZEPAM 0.5 MG: 2 INJECTION INTRAMUSCULAR; INTRAVENOUS at 20:54

## 2024-12-31 RX ADMIN — HYDROCODONE BITARTRATE AND ACETAMINOPHEN 1 TABLET: 5; 325 TABLET ORAL at 23:10

## 2024-12-31 RX ADMIN — IOPAMIDOL 100 ML: 755 INJECTION, SOLUTION INTRAVENOUS at 22:45

## 2024-12-31 RX ADMIN — CEFTRIAXONE SODIUM 1000 MG: 1 INJECTION, POWDER, FOR SOLUTION INTRAMUSCULAR; INTRAVENOUS at 20:53

## 2024-12-31 RX ADMIN — SODIUM CHLORIDE 500 ML: 9 INJECTION, SOLUTION INTRAVENOUS at 22:22

## 2024-12-31 RX ADMIN — SODIUM CHLORIDE 1250 MG: 9 INJECTION, SOLUTION INTRAVENOUS at 20:49

## 2024-12-31 ASSESSMENT — LIFESTYLE VARIABLES
HOW OFTEN DO YOU HAVE A DRINK CONTAINING ALCOHOL: NEVER
HOW MANY STANDARD DRINKS CONTAINING ALCOHOL DO YOU HAVE ON A TYPICAL DAY: PATIENT DOES NOT DRINK

## 2024-12-31 ASSESSMENT — PAIN DESCRIPTION - ORIENTATION: ORIENTATION: LEFT

## 2024-12-31 ASSESSMENT — PAIN DESCRIPTION - DESCRIPTORS: DESCRIPTORS: DISCOMFORT

## 2024-12-31 ASSESSMENT — PAIN DESCRIPTION - LOCATION: LOCATION: BREAST

## 2024-12-31 ASSESSMENT — PAIN SCALES - GENERAL: PAINLEVEL_OUTOF10: 10

## 2024-12-31 ASSESSMENT — PAIN - FUNCTIONAL ASSESSMENT: PAIN_FUNCTIONAL_ASSESSMENT: 0-10

## 2025-01-01 PROCEDURE — 96366 THER/PROPH/DIAG IV INF ADDON: CPT

## 2025-01-01 NOTE — ED PROVIDER NOTES
[facility] EMERGENCY DEPT  EMERGENCY DEPARTMENT ENCOUNTER       Pt Name: Carin Chacon  MRN: [id number]  Birthdate 1990  Date of evaluation: 12/31/2024  Provider: RYLEY Live   PCP: Vinicio John III, DO  Note Started: 11:35 PM EST 12/31/24     CHIEF COMPLAINT       Chief Complaint   Patient presents with    Abscess     Hx of HS        HISTORY OF PRESENT ILLNESS: 1 or more elements      History From: Patient  None     Carin Chacon is a 34 y.o. female with past medical history of diabetes, HS, and additional history as noted below, who presents to the ED for evaluation of left breast abscess. States she has had swelling and tenderness gradually worsening over the past 2 weeks. States the area started draining 2 days ago. Denies fevers, chest pain, SOB. States she has a history of similar breast abscesses in the past.      Nursing Notes were all reviewed and agreed with or any disagreements were addressed in the HPI.     REVIEW OF SYSTEMS      Review of Systems   All other systems reviewed and are negative.       Positives and Pertinent negatives as per HPI.    PAST HISTORY     Past Medical History:  Past Medical History:   Diagnosis Date    ADD (attention deficit disorder) 1/9/2014    Aggressive outburst     Anxiety disorder     Arthritis     right ankle     Asthma     Bipolar depression (HCC)     Bipolar disorder (HCC) 9/17/2013    Chronic pain     back     Contact dermatitis and other eczema, due to unspecified cause     boil    Depression     Diabetes (HCC)     borderline/ no meds    Diabetes mellitus (HCC)     Ectopic pregnancy, tubal     Essential hypertension     ON ONE OCCASION NO MEDS    Headache(784.0)     migraines    Hidradenitis     NECK    Hidradenitis suppurativa of multiple sites 11/05/2015    Ill-defined condition     anxiety disorder    Infertility, female     Cheyanne (HCC)     Mood disorder (HCC) 1/8/2014    Morbid obesity     Psychotic disorder (HCC)     Recurrent boils     Sickle cell

## 2025-01-01 NOTE — ED NOTES
Discharge instructions were given to the patient by sammy becerril.     The patient left the Emergency Department alert and oriented and in no acute distress with 4 prescriptions. The patient was encouraged to call or return to the ED for worsening issues or problems and was encouraged to schedule a follow up appointment for continuing care.     Ambulation assessment completed before discharge.  Pt left Emergency Department ambulating at baseline with no ortho devices  Ortho device education: none    The patient verbalized understanding of discharge instructions and prescriptions, all questions were answered. The patient has no further concerns at this time.

## 2025-01-01 NOTE — ED TRIAGE NOTES
Pt present to ED with cc of abscess pt has hx of HS. Has abscess on L breast for 2 weeks that she made bust and has been draining x2 days.   Pt had surgery on R breast 2 months.  Pt wants pain meds and antibiotic.   No fevers or chills.

## 2025-01-01 NOTE — DISCHARGE INSTRUCTIONS
Thank You!    It was a pleasure taking care of you in our Emergency Department today. We know that when you come to [facility], you are entrusting us with your health, comfort, and safety. Our clinicians honor that trust, and truly appreciate the opportunity to care for you and your loved ones.    If you receive a survey about your Emergency Department experience today, please fill it out.  We value your feedback. Thank you.      Elsy Bucio PA-C    ___________________________________  I have included a copy of your lab results and/or radiologic studies from today's visit so you can have them easily available at your follow-up visit.   Recent Results (from the past 12 hour(s))   CBC with Auto Differential    Collection Time: 12/31/24  8:27 PM   Result Value Ref Range    WBC 7.8 3.6 - 11.0 K/uL    RBC 4.91 3.80 - 5.20 M/uL    Hemoglobin 10.7 (L) 11.5 - 16.0 g/dL    Hematocrit 33.8 (L) 35.0 - 47.0 %    MCV 68.8 (L) 80.0 - 99.0 FL    MCH 21.8 (L) 26.0 - 34.0 PG    MCHC 31.7 30.0 - 36.5 g/dL    RDW 17.4 (H) 11.5 - 14.5 %    Platelets 361 150 - 400 K/uL    MPV 9.1 8.9 - 12.9 FL    Nucleated RBCs 0.0 0  WBC    nRBC 0.00 0.00 - 0.01 K/uL    Neutrophils % 61 32 - 75 %    Lymphocytes % 31 12 - 49 %    Monocytes % 6 5 - 13 %    Eosinophils % 2 0 - 7 %    Basophils % 0 0 - 1 %    Immature Granulocytes % 0 0.0 - 0.5 %    Neutrophils Absolute 4.7 1.8 - 8.0 K/UL    Lymphocytes Absolute 2.4 0.8 - 3.5 K/UL    Monocytes Absolute 0.5 0.0 - 1.0 K/UL    Eosinophils Absolute 0.2 0.0 - 0.4 K/UL    Basophils Absolute 0.0 0.0 - 0.1 K/UL    Immature Granulocytes Absolute 0.0 0.00 - 0.04 K/UL    Differential Type SMEAR SCANNED      RBC Comment ANISOCYTOSIS  1+       Comprehensive Metabolic Panel    Collection Time: 12/31/24  8:27 PM   Result Value Ref Range    Sodium 131 (L) 136 - 145 mmol/L    Potassium 3.7 3.5 - 5.1 mmol/L    Chloride 95 (L) 97 - 108 mmol/L    CO2 26 21 - 32 mmol/L    Anion

## 2025-01-02 LAB
ACB COMPLEX DNA BLD POS QL NAA+NON-PROBE: NOT DETECTED
ACCESSION NUMBER, LLC1M: ABNORMAL
B FRAGILIS DNA BLD POS QL NAA+NON-PROBE: NOT DETECTED
BIOFIRE TEST COMMENT: ABNORMAL
C ALBICANS DNA BLD POS QL NAA+NON-PROBE: NOT DETECTED
C AURIS DNA BLD POS QL NAA+NON-PROBE: NOT DETECTED
C GATTII+NEOFOR DNA BLD POS QL NAA+N-PRB: NOT DETECTED
C GLABRATA DNA BLD POS QL NAA+NON-PROBE: NOT DETECTED
C KRUSEI DNA BLD POS QL NAA+NON-PROBE: NOT DETECTED
C PARAP DNA BLD POS QL NAA+NON-PROBE: NOT DETECTED
C TROPICLS DNA BLD POS QL NAA+NON-PROBE: NOT DETECTED
E CLOAC COMP DNA BLD POS NAA+NON-PROBE: NOT DETECTED
E COLI DNA BLD POS QL NAA+NON-PROBE: NOT DETECTED
E FAECALIS DNA BLD POS QL NAA+NON-PROBE: NOT DETECTED
E FAECIUM DNA BLD POS QL NAA+NON-PROBE: NOT DETECTED
ENTEROBACTERALES DNA BLD POS NAA+N-PRB: NOT DETECTED
GP B STREP DNA BLD POS QL NAA+NON-PROBE: NOT DETECTED
HAEM INFLU DNA BLD POS QL NAA+NON-PROBE: NOT DETECTED
K OXYTOCA DNA BLD POS QL NAA+NON-PROBE: NOT DETECTED
KLEBSIELLA SP DNA BLD POS QL NAA+NON-PRB: NOT DETECTED
KLEBSIELLA SP DNA BLD POS QL NAA+NON-PRB: NOT DETECTED
L MONOCYTOG DNA BLD POS QL NAA+NON-PROBE: NOT DETECTED
MECA+MECC ISLT/SPM QL: DETECTED
N MEN DNA BLD POS QL NAA+NON-PROBE: NOT DETECTED
P AERUGINOSA DNA BLD POS NAA+NON-PROBE: NOT DETECTED
PROTEUS SP DNA BLD POS QL NAA+NON-PROBE: NOT DETECTED
RESISTANT GENE TARGETS: ABNORMAL
S AUREUS DNA BLD POS QL NAA+NON-PROBE: NOT DETECTED
S AUREUS+CONS DNA BLD POS NAA+NON-PROBE: DETECTED
S EPIDERMIDIS DNA BLD POS QL NAA+NON-PRB: DETECTED
S LUGDUNENSIS DNA BLD POS QL NAA+NON-PRB: NOT DETECTED
S MALTOPHILIA DNA BLD POS QL NAA+NON-PRB: NOT DETECTED
S MARCESCENS DNA BLD POS NAA+NON-PROBE: NOT DETECTED
S PNEUM DNA BLD POS QL NAA+NON-PROBE: NOT DETECTED
S PYO DNA BLD POS QL NAA+NON-PROBE: NOT DETECTED
SALMONELLA DNA BLD POS QL NAA+NON-PROBE: NOT DETECTED
STREPTOCOCCUS DNA BLD POS NAA+NON-PROBE: NOT DETECTED

## 2025-01-02 NOTE — ED NOTES
This RN spoke to [facility] lab for critical result on patient culture. The culture was positive for Gram positive(+) cocci in clusters.

## 2025-01-03 LAB
BACTERIA SPEC CULT: ABNORMAL
BACTERIA SPEC CULT: ABNORMAL
SERVICE CMNT-IMP: ABNORMAL

## 2025-01-05 LAB
BACTERIA SPEC CULT: NORMAL
SERVICE CMNT-IMP: NORMAL

## 2025-01-06 LAB
BACTERIA SPEC CULT: NORMAL
SERVICE CMNT-IMP: NORMAL

## 2025-01-07 ENCOUNTER — OFFICE VISIT (OUTPATIENT)
Age: 35
End: 2025-01-07

## 2025-01-07 ENCOUNTER — TELEPHONE (OUTPATIENT)
Age: 35
End: 2025-01-07

## 2025-01-07 DIAGNOSIS — E66.01 MORBID OBESITY: Primary | ICD-10-CM

## 2025-01-07 NOTE — PROGRESS NOTES
[facility] at [facility]  Supervised Weight Loss     Date:   2025    Patient's Name: Carin Chacon  : 1990    Carin Chacon was evaluated through a synchronous (real-time) audio encounter. Patient identification was verified at the start of the visit.   The patient was located at Home: [address] [address].  The provider was located at Home (Appt Dept State): [place].  Confirm you are appropriately licensed, registered, or certified to deliver care in the state where the patient is located as indicated above. If you are not or unsure, please re-schedule the visit: Yes, I confirm.     Insurance:  [facility]                                Session:   Surgery: Gastric Bypass                  Surgeon:  Gera Melgar M.D.      Height: 66\"                 Reported Weight:    363      Lbs.                         BMI: 58             Pounds Lost since last month: 0#               Pounds Gained since last month: 0#     Starting Weight: 373#                         Previous Month’s Weight: 363#  Overall Pounds Lost: 10#                   Overall Pounds Gained: 0#     Other Pertinent Information: Today's appointment was completed over the phone. Pt recommended to lose 18# prior to surgery. Today's wt was self-reported. Pt reports being \"in and out\" of the hospital this past month and several deaths in the family. Currently takes a prenatal MVI.     Smoking Status:  none  Alcohol Intake: none    I have reviewed with pt the guidelines of the supervised wt loss program.  Pt understands the expectations of some wt loss during the program and that wt gain could delay the process. I have also explained that appointments need to be consecutive and missing an appointment may result in starting over. Pt has received this information in writing. This appointment was complimentary/non-billable as part of the bariatric surgery program.          Changes that patient has made since

## 2025-01-07 NOTE — TELEPHONE ENCOUNTER
Identified patient with two patient identifiers (name and ). Reviewed chart in preparation for encounter and have obtained necessary documentation.      Called and spoke with patient regarding SWL requirements. Patient still needs UGI, Psych eval, Sleep study and to finish nutrition. Patient understood what was discussed and thankful for the follow up call.   
Home

## 2025-01-14 ENCOUNTER — TELEPHONE (OUTPATIENT)
Age: 35
End: 2025-01-14

## 2025-01-14 NOTE — TELEPHONE ENCOUNTER
Attempted to contact patient in regards to confirming appointment for 1/16/25. No answer, left a voicemail for a returned call.

## 2025-02-04 ENCOUNTER — OFFICE VISIT (OUTPATIENT)
Age: 35
End: 2025-02-04

## 2025-02-04 ENCOUNTER — TELEPHONE (OUTPATIENT)
Age: 35
End: 2025-02-04

## 2025-02-04 DIAGNOSIS — E66.01 MORBID OBESITY: Primary | ICD-10-CM

## 2025-02-04 NOTE — TELEPHONE ENCOUNTER
Identified patient with two patient identifiers (name and ). Reviewed chart in preparation for encounter and have obtained necessary documentation.      Called and spoke with patient to schedule midway with vargas. Patient is scheduled for 25 at 10:20 Am. Patient understood what was discussed and thankful for the follow up call.

## 2025-02-04 NOTE — PROGRESS NOTES
[facility] at [facility]  Supervised Weight Loss     Date:   2025    Patient's Name: Carin Chacon  : 1990    Carin Chacon was evaluated through a synchronous (real-time) audio encounter. Patient identification was verified at the start of the visit.   The patient was located at Home: [address] [address].  The provider was located at Facility (Appt Dept): [address] [place], [place] [id number].  Confirm you are appropriately licensed, registered, or certified to deliver care in the state where the patient is located as indicated above. If you are not or unsure, please re-schedule the visit: Yes, I confirm.     Insurance:  [facility]                                Session:   Surgery: Gastric Bypass                  Surgeon:  Gera Melgar M.D.      Height: 66\"                 Reported Weight:    363      Lbs.                         BMI: 58             Pounds Lost since last month: 0#               Pounds Gained since last month: 0#     Starting Weight: 373#                          Previous Month’s Weight: 363#  Overall Pounds Lost: 10#                   Overall Pounds Gained: 0#     Other Pertinent Information: Today's appointment was completed over the phone. Pt recommended to lose 18# prior to surgery. Today's wt was self-reported. Pt reports she has continued health issues \"in and out of the hospital\" and has diarrhea for the past week. This has decreased appetite and overall intake.      Smoking Status:  none  Alcohol Intake: none    I have reviewed with pt the guidelines of the supervised wt loss program.  Pt understands the expectations of some wt loss during the program and that wt gain could delay the process. I have also explained that appointments need to be consecutive and missing an appointment may result in starting over. Pt has received this information in writing. This appointment was complimentary/non-billable as

## 2025-02-10 ENCOUNTER — TELEPHONE (OUTPATIENT)
Age: 35
End: 2025-02-10

## 2025-02-11 ENCOUNTER — TELEPHONE (OUTPATIENT)
Age: 35
End: 2025-02-11

## 2025-02-18 ASSESSMENT — SLEEP AND FATIGUE QUESTIONNAIRES
HOW LIKELY ARE YOU TO NOD OFF OR FALL ASLEEP WHILE SITTING QUIETLY AFTER LUNCH WITHOUT ALCOHOL: MODERATE CHANCE OF DOZING
HOW LIKELY ARE YOU TO NOD OFF OR FALL ASLEEP WHILE SITTING INACTIVE IN A PUBLIC PLACE: MODERATE CHANCE OF DOZING
HOW LIKELY ARE YOU TO NOD OFF OR FALL ASLEEP WHEN YOU ARE A PASSENGER IN A CAR FOR AN HOUR WITHOUT A BREAK: WOULD NEVER DOZE
HOW LIKELY ARE YOU TO NOD OFF OR FALL ASLEEP WHILE SITTING AND TALKING TO SOMEONE: WOULD NEVER DOZE
HOW LIKELY ARE YOU TO NOD OFF OR FALL ASLEEP IN A CAR, WHILE STOPPED FOR A FEW MINUTES IN TRAFFIC: WOULD NEVER DOZE
HOW LIKELY ARE YOU TO NOD OFF OR FALL ASLEEP WHILE WATCHING TV: HIGH CHANCE OF DOZING
ESS TOTAL SCORE: 10
HOW LIKELY ARE YOU TO NOD OFF OR FALL ASLEEP WHILE SITTING AND READING: WOULD NEVER DOZE
HOW LIKELY ARE YOU TO NOD OFF OR FALL ASLEEP WHILE LYING DOWN TO REST IN THE AFTERNOON WHEN CIRCUMSTANCES PERMIT: HIGH CHANCE OF DOZING

## 2025-02-20 ENCOUNTER — TELEMEDICINE (OUTPATIENT)
Age: 35
End: 2025-02-20
Payer: MEDICAID

## 2025-02-20 DIAGNOSIS — G47.33 OBSTRUCTIVE SLEEP APNEA (ADULT) (PEDIATRIC): Primary | ICD-10-CM

## 2025-02-20 DIAGNOSIS — E11.9 TYPE 2 DIABETES MELLITUS WITHOUT COMPLICATION, WITHOUT LONG-TERM CURRENT USE OF INSULIN (HCC): ICD-10-CM

## 2025-02-20 PROCEDURE — 99204 OFFICE O/P NEW MOD 45 MIN: CPT | Performed by: INTERNAL MEDICINE

## 2025-02-20 NOTE — PATIENT INSTRUCTIONS
[address]  [place] [address]  Tel.  [phone]  Fax. [phone] [address]  [place] [address]  Tel.  [phone]  Fax. [phone] [address] [place] [place] [address]  Tel.  [phone]  Fax. [phone]     Sleep Apnea: After Your Visit  Your Care Instructions  Sleep apnea occurs when you frequently stop breathing for 10 seconds or longer during sleep. It can be mild to severe, based on the number of times per hour that you stop breathing or have slowed breathing. Blocked or narrowed airways in your nose, mouth, or throat can cause sleep apnea. Your airway can become blocked when your throat muscles and tongue relax during sleep.  Sleep apnea is common, occurring in 1 out of 20 individuals.  Individuals having any of the following characteristics should be evaluated and treated right away due to high risk and detrimental consequences from untreated sleep apnea:  Obesity  Congestive Heart failure  Atrial Fibrillation  Uncontrolled Hypertension  Type II Diabetes  Night-time Arrhythmias  Stroke  Pulmonary Hypertension  High-risk Driving Populations (pilots, truck drivers, etc.)  Patients Considering Weight-loss Surgery    How do you know you have sleep apnea?  You probably have sleep apnea if you answer 'yes' to 3 or more of the following questions:  S - Have you been told that you Snore?   T - Are you often Tired during the day?  O - Has anyone Observed you stop breathing while sleeping?  P- Do you have (or are being treated for) high blood Pressure?    B - Are you obese (Body Mass Index > 35)?  A - Is your Age 50 years old or older?  N - Is your Neck size greater than 16 inches?  G - Are you male Gender?  A sleep physician can prescribe a breathing device that prevents tissues in the throat from blocking your airway. Or your doctor may recommend using a dental device (oral breathing device) to help keep your airway open. In some cases, surgery may

## 2025-02-20 NOTE — PROGRESS NOTES
Carin Chacon, was evaluated through a synchronous (real-time) audio-video encounter. The patient (or guardian if applicable) is aware that this is a billable service, which includes applicable co-pays. This Virtual Visit was conducted with patient's (and/or legal guardian's) consent. Patient identification was verified, and a caregiver was present when appropriate.   The patient was located at Home: [address] [place] [id number]  Provider was located at Home (Appt Dept State): [place]  Confirm you are appropriately licensed, registered, or certified to deliver care in the state where the patient is located as indicated above. If you are not or unsure, please re-schedule the visit: Yes, I confirm.      Total time spent for this encounter: Not billed by time    --Guillermina Ragsdale MD on 2/20/2025 at 12:05 PM    An electronic signature was used to authenticate this note.         Patient called and identity confirmed with 2 patient identifers     Carin Chacon is a 35 y.o. female who was seen by synchronous (real-time) audio-video technology on 2/20/2025.           --Guillermina Ragsdale MD on 2/20/2025 at 12:05 PM                                 [address]  [place] [address]  Tel.  [phone]  Fax. [phone] [address]  [place] [address]  Tel.  [phone]  Fax. [phone] [address] [address]  Tel.  [phone]  Fax. [phone]         Subjective:             Carin Chacon is an 35 y.o. female referred for evaluation for a sleep disorder.       She complains of snoring, periods of not breathing associated with needs a new PAP.   She was seen in 2020 as a follow up on PAP treatment. Her device was broken and she was having signifcant comfort issues with PAP.I ordered a new PAP device for her at that time but dme company said she had a balance to fulfill prior to being able to obtain new PAP. She says she has a new insurance now.    AHI = 7 (3-19).

## 2025-02-23 ENCOUNTER — HOSPITAL ENCOUNTER (EMERGENCY)
Facility: HOSPITAL | Age: 35
Discharge: HOME | End: 2025-02-23
Payer: MEDICAID

## 2025-02-23 VITALS
TEMPERATURE: 97.8 F | HEART RATE: 106 BPM | SYSTOLIC BLOOD PRESSURE: 148 MMHG | OXYGEN SATURATION: 98 % | RESPIRATION RATE: 20 BRPM | WEIGHT: 293 LBS | HEIGHT: 66 IN | BODY MASS INDEX: 47.09 KG/M2 | DIASTOLIC BLOOD PRESSURE: 104 MMHG

## 2025-02-23 DIAGNOSIS — N61.1 LEFT BREAST ABSCESS: Primary | ICD-10-CM

## 2025-02-23 DIAGNOSIS — N76.0 VAGINITIS AND VULVOVAGINITIS: ICD-10-CM

## 2025-02-23 DIAGNOSIS — B37.49 CANDIDIASIS OF PERINEUM: ICD-10-CM

## 2025-02-23 LAB
APPEARANCE UR: ABNORMAL
BACTERIA URNS QL MICRO: NEGATIVE /HPF
BILIRUB UR QL: NEGATIVE
CLUE CELLS VAG QL WET PREP: ABNORMAL
COLOR UR: ABNORMAL
EPITH CASTS URNS QL MICRO: ABNORMAL /LPF
GLUCOSE UR STRIP.AUTO-MCNC: >1000 MG/DL
HCG UR QL: NEGATIVE
HGB UR QL STRIP: ABNORMAL
KETONES UR QL STRIP.AUTO: 15 MG/DL
LEUKOCYTE ESTERASE UR QL STRIP.AUTO: ABNORMAL
NITRITE UR QL STRIP.AUTO: NEGATIVE
PH UR STRIP: 6 (ref 5–8)
PROT UR STRIP-MCNC: NEGATIVE MG/DL
RBC #/AREA URNS HPF: ABNORMAL /HPF (ref 0–5)
SP GR UR REFRACTOMETRY: 1.01 (ref 1–1.03)
T VAGINALIS VAG QL WET PREP: ABNORMAL
URINE CULTURE IF INDICATED: ABNORMAL
UROBILINOGEN UR QL STRIP.AUTO: 0.2 EU/DL (ref 0.2–1)
WBC URNS QL MICRO: ABNORMAL /HPF (ref 0–4)
YEAST URNS QL MICRO: PRESENT
YEAST WET PREP: ABNORMAL

## 2025-02-23 PROCEDURE — 99283 EMERGENCY DEPT VISIT LOW MDM: CPT

## 2025-02-23 PROCEDURE — 87491 CHLMYD TRACH DNA AMP PROBE: CPT

## 2025-02-23 PROCEDURE — 81001 URINALYSIS AUTO W/SCOPE: CPT

## 2025-02-23 PROCEDURE — 81025 URINE PREGNANCY TEST: CPT

## 2025-02-23 PROCEDURE — 6360000002 HC RX W HCPCS

## 2025-02-23 PROCEDURE — 87210 SMEAR WET MOUNT SALINE/INK: CPT

## 2025-02-23 PROCEDURE — 87591 N.GONORRHOEAE DNA AMP PROB: CPT

## 2025-02-23 PROCEDURE — 6370000000 HC RX 637 (ALT 250 FOR IP)

## 2025-02-23 RX ORDER — HYDROXYZINE HYDROCHLORIDE 25 MG/1
50 TABLET, FILM COATED ORAL ONCE
Status: COMPLETED | OUTPATIENT
Start: 2025-02-23 | End: 2025-02-23

## 2025-02-23 RX ORDER — HYDROXYZINE HYDROCHLORIDE 50 MG/1
50 TABLET, FILM COATED ORAL EVERY 8 HOURS PRN
Qty: 30 TABLET | Refills: 0 | Status: SHIPPED | OUTPATIENT
Start: 2025-02-23 | End: 2025-03-05

## 2025-02-23 RX ORDER — FLUCONAZOLE 150 MG/1
150 TABLET ORAL
Qty: 2 TABLET | Refills: 0 | Status: SHIPPED | OUTPATIENT
Start: 2025-02-26 | End: 2025-03-04

## 2025-02-23 RX ORDER — FLUCONAZOLE 150 MG/1
150 TABLET ORAL
Status: COMPLETED | OUTPATIENT
Start: 2025-02-23 | End: 2025-02-23

## 2025-02-23 RX ORDER — NYSTATIN 100000 [USP'U]/G
POWDER TOPICAL
Qty: 120 G | Refills: 0 | Status: SHIPPED | OUTPATIENT
Start: 2025-02-23

## 2025-02-23 RX ORDER — CEPHALEXIN 500 MG/1
500 CAPSULE ORAL 4 TIMES DAILY
Qty: 28 CAPSULE | Refills: 0 | Status: SHIPPED | OUTPATIENT
Start: 2025-02-23 | End: 2025-03-02

## 2025-02-23 RX ORDER — DIPHENHYDRAMINE HYDROCHLORIDE 50 MG/ML
50 INJECTION INTRAMUSCULAR; INTRAVENOUS
Status: DISCONTINUED | OUTPATIENT
Start: 2025-02-23 | End: 2025-02-23 | Stop reason: HOSPADM

## 2025-02-23 RX ORDER — SULFAMETHOXAZOLE AND TRIMETHOPRIM 800; 160 MG/1; MG/1
1 TABLET ORAL 2 TIMES DAILY
Qty: 20 TABLET | Refills: 0 | Status: SHIPPED | OUTPATIENT
Start: 2025-02-23 | End: 2025-03-05

## 2025-02-23 RX ADMIN — FLUCONAZOLE 150 MG: 150 TABLET ORAL at 10:06

## 2025-02-23 RX ADMIN — HYDROXYZINE HYDROCHLORIDE 50 MG: 25 TABLET, FILM COATED ORAL at 10:06

## 2025-02-23 ASSESSMENT — PAIN DESCRIPTION - PAIN TYPE: TYPE: CHRONIC PAIN

## 2025-02-23 ASSESSMENT — PAIN DESCRIPTION - LOCATION: LOCATION: BREAST

## 2025-02-23 ASSESSMENT — PAIN SCALES - GENERAL: PAINLEVEL_OUTOF10: 10

## 2025-02-23 ASSESSMENT — PAIN DESCRIPTION - ORIENTATION: ORIENTATION: LEFT

## 2025-02-23 ASSESSMENT — PAIN DESCRIPTION - DESCRIPTORS: DESCRIPTORS: THROBBING

## 2025-02-23 ASSESSMENT — PAIN - FUNCTIONAL ASSESSMENT: PAIN_FUNCTIONAL_ASSESSMENT: 0-10

## 2025-02-23 NOTE — ED PROVIDER NOTES
drainage from the left breast, patient prescribed oral antibiotics.  I have encouraged patient to follow-up with breast specialist as soon as possible for further management of the breast abscess.  In the meantime, encourage patient to increase probiotics in her diet.  Lifestyle changes to keep perineal area clean and dry as possible.  PCP follow-up.  GYN follow-up.  Return to ED for worsening symptoms.  Patient verbalized understanding and agrees with plan of care.    Disposition Considerations (Tests not done, Shared Decision Making, Pt Expectation of Test or Tx.): As above     FINAL IMPRESSION     1. Left breast abscess    2. Candidiasis of perineum    3. Vaginitis and vulvovaginitis          DISPOSITION/PLAN   DISPOSITION Decision To Discharge 02/23/2025 10:57:49 AM   DISPOSITION CONDITION Stable     Discharge Note: The patient is stable for discharge home. The signs, symptoms, diagnosis, and discharge instructions have been discussed, understanding conveyed, and agreed upon. The patient is to follow up as recommended or return to ER should their symptoms worsen.      PATIENT REFERRED TO:  Adilene Dalal MD  [address]  [facility] [address] [phone]    Schedule an appointment as soon as possible for a visit       Vinicio John III, DO  [address]  [facility] [id number] [phone]    In 1 week      [facility] Emergency Department  [address] [facility] [address]  [phone]    If symptoms worsen       DISCHARGE MEDICATIONS:     Medication List        START taking these medications      cephALEXin 500 MG capsule  Commonly known as: KEFLEX  Take 1 capsule by mouth 4 times daily for 7 days     fluconazole 150 MG tablet  Commonly known as: DIFLUCAN  Take 1 tablet by mouth every 72 hours for 6 days  Start taking on: February 26, 2025     hydrOXYzine HCl 50 MG tablet  Commonly known as: ATARAX  Take 1 tablet by mouth every 8

## 2025-02-23 NOTE — ED TRIAGE NOTES
Patient reports two draining abscesses under her left breast.  She also reports vaginal itching and a rash in her perianal area for two weeks. Patient reports she has been exposed to genital herpes by her partner.

## 2025-02-24 ENCOUNTER — TELEPHONE (OUTPATIENT)
Age: 35
End: 2025-02-24

## 2025-02-24 LAB
C TRACH DNA SPEC QL NAA+PROBE: NEGATIVE
N GONORRHOEA DNA SPEC QL NAA+PROBE: NEGATIVE
SAMPLE TYPE: NORMAL
SERVICE CMNT-IMP: NORMAL
SPECIMEN SOURCE: NORMAL

## 2025-02-24 NOTE — TELEPHONE ENCOUNTER
Attempted to contact pt regarding her appt today, LVM for pt to call our office to r/s at their earliest convenience.

## 2025-02-26 ENCOUNTER — HOSPITAL ENCOUNTER (OUTPATIENT)
Facility: HOSPITAL | Age: 35
Discharge: HOME | End: 2025-03-01
Payer: MEDICAID

## 2025-02-26 VITALS
TEMPERATURE: 98.1 F | HEIGHT: 66 IN | WEIGHT: 293 LBS | BODY MASS INDEX: 47.09 KG/M2 | OXYGEN SATURATION: 96 % | HEART RATE: 85 BPM

## 2025-02-26 DIAGNOSIS — G47.33 OBSTRUCTIVE SLEEP APNEA (ADULT) (PEDIATRIC): ICD-10-CM

## 2025-02-26 PROCEDURE — 95810 POLYSOM 6/> YRS 4/> PARAM: CPT | Performed by: INTERNAL MEDICINE

## 2025-02-28 ENCOUNTER — CLINICAL DOCUMENTATION (OUTPATIENT)
Age: 35
End: 2025-02-28

## 2025-02-28 DIAGNOSIS — G47.33 OBSTRUCTIVE SLEEP APNEA (ADULT) (PEDIATRIC): Primary | ICD-10-CM

## 2025-02-28 NOTE — PROGRESS NOTES
Results of sleep study in R-drive  Lead tech to convey results to patient    Results of polysomnogram in r- drive    Lead tech to convey results and next steps to patient     The polysomnogram/attended sleep study was positive for significant sleep apnea. AHI 10/hour (a positive test shows an AHI of over 5/hour. The lowest oxygen saturation was 84%. Treatment options were discussed at her recent  consultation. Based on the results of the polysomnogram a trial of APAP (auto-adjusting positive airway pressure would be an effective mode of therapy to control the apnea.      APAP order attached. she should be seen in the sleep disorder center 4-6 weeks after initiating PAP therapy.     If she has difficulty tolerating the auto-adjusting PAP, we can bring her in for an in-lab PAP titration to optimize pressure settings and address and barriers to PAP comfort and adherence.    Patient should call the office the day she gets set up with new PAP device so we can schedule her for an adherence/compliance visit within 31-90 days of obtaining a new device.        Front staff to Order PAP and call patient and let them know which DME company they should be hearing from after results reviewed with lead support technologist.     Patient will need a first adherence visit.

## (undated) DEVICE — ELECTRODE PT RET AD L9FT HI MOIST COND ADH HYDRGEL CORDED

## (undated) DEVICE — TAPE DSG RETEN W2INXL10YD NONWOVEN COMFORTABLE H2O RESIST

## (undated) DEVICE — GLOVE SURG SZ 8 L12IN FNGR THK94MIL STD WHT LTX FREE

## (undated) DEVICE — GARMENT,MEDLINE,DVT,INT,CALF,MED, GEN2: Brand: MEDLINE

## (undated) DEVICE — SUTURE MONOCRYL SZ 4-0 L27IN ABSRB UD L19MM PS-2 1/2 CIR PRIM Y426H

## (undated) DEVICE — HYPODERMIC SAFETY NEEDLE: Brand: MAGELLAN

## (undated) DEVICE — BASIN ST MAJOR-NO CAUTERY: Brand: MEDLINE INDUSTRIES, INC.

## (undated) DEVICE — SUTURE VICRYL + SZ 3-0 L27IN ABSRB UD L26MM SH 1/2 CIR VCP416H

## (undated) DEVICE — TOWEL,OR,DSP,ST,BLUE,STD,4/PK,20PK/CS: Brand: MEDLINE

## (undated) DEVICE — SOLUTION IRRIG 1000ML 0.9% SOD CHL USP POUR PLAS BTL

## (undated) DEVICE — DRAIN SURG PENROSE 0.25X18 IN CLOSED WND DRAINAGE PREM SIL

## (undated) DEVICE — PENCIL ES CRD L10FT HND SWCHING ROCK SWCH W/ EDGE COAT BLDE

## (undated) DEVICE — GLOVE ORANGE PI 7 1/2   MSG9075

## (undated) DEVICE — INTENT OT USE PROVIDES A STERILE INTERFACE BETWEEN THE OPERATING ROOM SURGICAL LAMPS (NON-STERILE) AND THE SURGEON OR STAFF WORKING IN THE STERILE FIELD.: Brand: ASPEN® ALC PLUS LIGHT HANDLE COVER

## (undated) DEVICE — X-RAY DETECTABLE SPONGES,16 PLY: Brand: VISTEC

## (undated) DEVICE — LIQUIBAND RAPID ADHESIVE 36/CS 0.8ML: Brand: MEDLINE

## (undated) DEVICE — SYRINGE MED 10ML LUERLOCK TIP W/O SFTY DISP

## (undated) DEVICE — PACK,BASIC,SIRUS,V: Brand: MEDLINE

## (undated) DEVICE — PENCIL SMK EVAC 10 FT BLADE ELECTRD ROCKER FOR TELSCP

## (undated) DEVICE — GLOVE ORANGE PI 8   MSG9080

## (undated) DEVICE — SUTURE PERMA-HAND SZ 2-0 L30IN NONABSORBABLE BLK L26MM SH K833H

## (undated) DEVICE — APPLICATOR MEDICATED 26 CC SOLUTION HI LT ORNG CHLORAPREP

## (undated) DEVICE — SUTURE PERMAHAND SZ 2-0 L18IN NONABSORBABLE BLK L26MM PS 1588H

## (undated) DEVICE — SPONGE GZ W4XL4IN COT 12 PLY TYP VII WVN C FLD DSGN STERILE

## (undated) DEVICE — HYPODERMIC SAFETY NEEDLE: Brand: MONOJECT

## (undated) DEVICE — BLADE CLIPPER GEN PURP NS

## (undated) DEVICE — PACK,LAPAROTOMY,2 REINFORCED GOWNS: Brand: MEDLINE

## (undated) DEVICE — SOLUTION IRRIG 1000ML STRL H2O USP PLAS POUR BTL

## (undated) DEVICE — STERILE COTTON BALLS LARGE 5/P: Brand: MEDLINE

## (undated) DEVICE — SUTURE PERMAHAND SZ 2-0 L30IN NONABSORBABLE BLK SILK W/O A305H